# Patient Record
Sex: MALE | Race: WHITE | NOT HISPANIC OR LATINO | Employment: UNEMPLOYED | URBAN - NONMETROPOLITAN AREA
[De-identification: names, ages, dates, MRNs, and addresses within clinical notes are randomized per-mention and may not be internally consistent; named-entity substitution may affect disease eponyms.]

---

## 2017-02-14 NOTE — TELEPHONE ENCOUNTER
Spoke with girlfriend let her know we cannot refill his insulin until he comes in and has labs done

## 2017-07-21 ENCOUNTER — OFFICE VISIT (OUTPATIENT)
Dept: FAMILY MEDICINE CLINIC | Facility: CLINIC | Age: 27
End: 2017-07-21

## 2017-07-21 ENCOUNTER — APPOINTMENT (OUTPATIENT)
Dept: LAB | Facility: HOSPITAL | Age: 27
End: 2017-07-21

## 2017-07-21 VITALS
OXYGEN SATURATION: 98 % | DIASTOLIC BLOOD PRESSURE: 90 MMHG | HEART RATE: 100 BPM | HEIGHT: 72 IN | WEIGHT: 202 LBS | BODY MASS INDEX: 27.36 KG/M2 | SYSTOLIC BLOOD PRESSURE: 128 MMHG

## 2017-07-21 DIAGNOSIS — F41.1 GENERALIZED ANXIETY DISORDER: ICD-10-CM

## 2017-07-21 DIAGNOSIS — N28.9 RENAL INSUFFICIENCY: ICD-10-CM

## 2017-07-21 DIAGNOSIS — J30.9 ALLERGIC RHINITIS, UNSPECIFIED ALLERGIC RHINITIS TRIGGER, UNSPECIFIED RHINITIS SEASONALITY: ICD-10-CM

## 2017-07-21 DIAGNOSIS — E10.69 TYPE 1 DIABETES MELLITUS WITH OTHER SPECIFIED COMPLICATION (HCC): Primary | ICD-10-CM

## 2017-07-21 LAB
ALBUMIN UR-MCNC: 0.8 MG/L
ANION GAP SERPL CALCULATED.3IONS-SCNC: 16 MMOL/L (ref 5–15)
BUN BLD-MCNC: 25 MG/DL (ref 7–21)
BUN/CREAT SERPL: 21.9 (ref 7–25)
CALCIUM SPEC-SCNC: 9.3 MG/DL (ref 8.4–10.2)
CHLORIDE SERPL-SCNC: 96 MMOL/L (ref 95–110)
CK SERPL-CCNC: 286 U/L (ref 55–170)
CO2 SERPL-SCNC: 26 MMOL/L (ref 22–31)
CREAT BLD-MCNC: 1.14 MG/DL (ref 0.7–1.3)
GFR SERPL CREATININE-BSD FRML MDRD: 76 ML/MIN/1.73 (ref 60–179)
GLUCOSE BLD-MCNC: 241 MG/DL (ref 60–100)
POTASSIUM BLD-SCNC: 4.1 MMOL/L (ref 3.5–5.1)
SODIUM BLD-SCNC: 138 MMOL/L (ref 137–145)

## 2017-07-21 PROCEDURE — 36415 COLL VENOUS BLD VENIPUNCTURE: CPT | Performed by: FAMILY MEDICINE

## 2017-07-21 PROCEDURE — 80048 BASIC METABOLIC PNL TOTAL CA: CPT | Performed by: FAMILY MEDICINE

## 2017-07-21 PROCEDURE — 82550 ASSAY OF CK (CPK): CPT | Performed by: FAMILY MEDICINE

## 2017-07-21 PROCEDURE — 82043 UR ALBUMIN QUANTITATIVE: CPT | Performed by: FAMILY MEDICINE

## 2017-07-21 PROCEDURE — 99214 OFFICE O/P EST MOD 30 MIN: CPT | Performed by: FAMILY MEDICINE

## 2017-07-21 RX ORDER — FLUTICASONE PROPIONATE 50 MCG
2 SPRAY, SUSPENSION (ML) NASAL DAILY
Qty: 1 EACH | Refills: 0 | Status: SHIPPED | OUTPATIENT
Start: 2017-07-21 | End: 2017-08-20

## 2017-07-21 RX ORDER — BUSPIRONE HYDROCHLORIDE 10 MG/1
TABLET ORAL
Qty: 60 TABLET | Refills: 5 | Status: SHIPPED | OUTPATIENT
Start: 2017-07-21 | End: 2017-08-23 | Stop reason: SDUPTHER

## 2017-07-21 NOTE — PROGRESS NOTES
Subjective   Chief Complaint   Patient presents with   • Establish Care       Woody Pelletier is a 26 y.o. male who presents for Establish Care     New to [facility] info:  Patient Care Team:  Ciara Brower MD as PCP - General (Internal Medicine)     History of Present Illness  Patient is here to establish care.  He has a history of type 1 diabetes that was diagnosed when he was an adolescent.  He was previously taking Lantus 24 units at bedtime as well as NovoLog 21 units 3 times daily with meals however he recently got some Toujeo from a friend and uses his blood sugar was much better controlled.  His blood sugar ranged from the low 100s to 180s are 190s with this and his mealtime NovoLog.    He recently had some blood work done at work which showed renal insufficiency.  He has been working out a lot lately and doing heavy lifting.    He also reports generalized anxiety, stress, temper issues.  He was previously tried on Celexa and has some difficulties urinating on this was changed to Lexapro which she states did not not help him after taking it for several weeks. Denies SI/HI    The following portions of the patient's history were reviewed and updated as appropriate:    Past Medical History:   Diagnosis Date   • Depression    • Diabetes mellitus        Past Surgical History:   Procedure Laterality Date   • ESOPHAGOSCOPY / EGD  11/09/2011    Daja-De Leon tear in esophagus. Epinephrine injection therapy applied to control bleeding. Area of erosion in body of stomach. Epinephrine injection therapy applied to control bleeding. Argon beam coagulation applied to control bleeding. Norm duod.       Family History   Problem Relation Age of Onset   • Diabetes Other    • Hypertension Other        Social History     Social History   • Marital status: Single     Spouse name: N/A   • Number of children: N/A   • Years of education: N/A     Occupational History   • Not on file.     Social History Main Topics   • Smoking  "status: Never Smoker   • Smokeless tobacco: Never Used   • Alcohol use Yes      Comment: SOCIAL   • Drug use: No   • Sexual activity: Not on file     Other Topics Concern   • Not on file     Social History Narrative       Medications:  Outpatient Medications Prior to Visit   Medication Sig Dispense Refill   • insulin aspart (NOVOLOG) 100 UNIT/ML injection Inject 21 Units under the skin 3 (Three) Times a Day Before Meals. 20 mL 3   • Insulin Syringe 31G X 5/16\" 0.5 ML misc USE AS DIRECTED FOUR TIMES DAILY 20 each 0   • Insulin Syringe 31G X 5/16\" 1 ML misc USE FOR INJECTIONS FOUR TIMES DAILY 30 each 0   • escitalopram (LEXAPRO) 20 MG tablet Take 1 tablet by mouth daily. 30 tablet 0   • insulin glargine (LANTUS) 100 UNIT/ML injection Inject 24 Units under the skin Every Night. 10 mL 3     No facility-administered medications prior to visit.        Allergies   Allergen Reactions   • Penicillins        Review of Systems   Constitutional: Negative for fatigue, fever and unexpected weight change.   HENT: Positive for congestion, postnasal drip and sneezing. Negative for rhinorrhea.    Eyes: Negative for visual disturbance.   Respiratory: Negative for cough and shortness of breath.    Cardiovascular: Negative for chest pain, palpitations and leg swelling.   Gastrointestinal: Negative for abdominal pain, blood in stool, constipation and diarrhea.   Endocrine: Negative for polydipsia, polyphagia and polyuria.        No hypoglycemia   Genitourinary: Negative for difficulty urinating, frequency and hematuria.   Musculoskeletal: Negative for arthralgias and back pain.   Skin: Negative for rash.   Neurological: Negative for headaches.   Psychiatric/Behavioral: Negative for sleep disturbance and suicidal ideas. The patient is nervous/anxious.        Objective   Visit Vitals   • /90   • Pulse 100   • Ht 72\" (182.9 cm)   • Wt 202 lb (91.6 kg)   • SpO2 98%   • BMI 27.4 kg/m2       Physical Exam   Constitutional: He is oriented " to person, place, and time. He appears well-developed and well-nourished. No distress.   HENT:   Head: Normocephalic and atraumatic.   Nose: Nose normal.   Eyes: Conjunctivae and EOM are normal.   Neck: Normal range of motion.   Cardiovascular: Normal rate, regular rhythm and normal heart sounds.  Exam reveals no gallop and no friction rub.    No murmur heard.  Pulmonary/Chest: Effort normal and breath sounds normal. No respiratory distress. He has no wheezes. He has no rales.   Musculoskeletal: Normal range of motion. He exhibits no edema.   Neurological: He is alert and oriented to person, place, and time. No cranial nerve deficit.   Skin: Skin is warm and dry. He is not diaphoretic.   Psychiatric: He has a normal mood and affect. His behavior is normal.   Nursing note and vitals reviewed.      No flowsheet data found.    Assessment/Plan   Woody Pelletier is a 26 y.o. male seen today for the followin. Type 1 diabetes mellitus with other specified complication  Change insulin to Toujeo. He will track blood sugar and bring to follow up visit in 3 months.   Change novolog to pen for easier use    - Insulin Glargine (TOUJEO SOLOSTAR) 300 UNIT/ML solution pen-injector; Inject 24 Units under the skin Daily.  Dispense: 2 pen; Refill: 11  - Insulin Pen Needle (B-D UF III MINI PEN NEEDLES) 31G X 5 MM misc; 1 each 4 (Four) Times a Day. Use with insulin pens  Dispense: 200 each; Refill: 11  - Basic Metabolic Panel  - MicroAlbumin, Urine, Random  - Ambulatory Referral to Ophthalmology  - insulin aspart (NOVOLOG FLEXPEN) 100 UNIT/ML solution pen-injector sc pen; Inject 10 Units under the skin 3 (Three) Times a Day With Meals.  Dispense: 2 pen; Refill: 11    2. Renal insufficiency  Repeat BMP. Will also check ck. Advised hydration    - Basic Metabolic Panel  - CK    3. Generalized anxiety disorder  Trial of buspar. Follow up 3 months, sooner if any problems    - busPIRone (BUSPAR) 10 MG tablet; Take 1/2 tab po bid x 7  days, then 1 tab po bid  Dispense: 60 tablet; Refill: 5    4. Allergic rhinitis, unspecified allergic rhinitis trigger, unspecified rhinitis seasonality  Start flonase    - fluticasone (FLONASE) 50 MCG/ACT nasal spray; 2 sprays into each nostril Daily for 30 days.  Dispense: 1 each; Refill: 0    Follow up: Return in about 3 months (around 10/21/2017) for Follow up Diabetes with labs few days before.            This document has been electronically signed by Annika Truong DO on July 21, 2017 5:03 PM

## 2017-07-25 ENCOUNTER — TELEPHONE (OUTPATIENT)
Dept: FAMILY MEDICINE CLINIC | Facility: CLINIC | Age: 27
End: 2017-07-25

## 2017-07-25 NOTE — TELEPHONE ENCOUNTER
----- Message from Annika Truong DO sent at 7/21/2017  1:36 PM CDT -----  Renal function was a little better than previous labs. His CK level was a little elevated so he is having some muscle breakdown which may be causing some damage to his kidneys. He needs to be sure to stay well hydrated and may want to decrease the intensity of heavy weight lifting that he is doing.      Pr Dr. Truong, Mr. Pelletier has been called with his recent lab results & recommendations.  Continue his current medications and follow-up as planned or sooner if any problems.

## 2017-07-25 NOTE — PROGRESS NOTES
Pr Dr. Truong, Mr. Pelletier has been called with his recent lab results & recommendations.  Continue his current medications and follow-up as planned or sooner if any problems.

## 2017-07-25 NOTE — TELEPHONE ENCOUNTER
----- Message from Krys Davenport sent at 7/25/2017  8:31 AM CDT -----  Regarding: MED CHANGE  Contact: [phone]  PT CALLED AND IS WANTING TO KNOW IF HE CAN GET HIS NOVOLOG WHICH IS GOING TO COST HIM $50 TO NOVOLIN R WHICH WILL COST HIM $12 BECAUSE IT WILL BE CHEAPER AND THE INS WILL PAY FOR IT. PLEASE SEND IT IN TO [facility] [place]. THANK YOU KRYS.

## 2017-08-23 ENCOUNTER — OFFICE VISIT (OUTPATIENT)
Dept: FAMILY MEDICINE CLINIC | Facility: CLINIC | Age: 27
End: 2017-08-23

## 2017-08-23 ENCOUNTER — APPOINTMENT (OUTPATIENT)
Dept: LAB | Facility: HOSPITAL | Age: 27
End: 2017-08-23

## 2017-08-23 VITALS
HEART RATE: 108 BPM | BODY MASS INDEX: 28.79 KG/M2 | DIASTOLIC BLOOD PRESSURE: 86 MMHG | SYSTOLIC BLOOD PRESSURE: 138 MMHG | WEIGHT: 212.6 LBS | HEIGHT: 72 IN | OXYGEN SATURATION: 98 %

## 2017-08-23 DIAGNOSIS — Z79.899 CONTROLLED SUBSTANCE AGREEMENT SIGNED: ICD-10-CM

## 2017-08-23 DIAGNOSIS — E10.69 TYPE 1 DIABETES MELLITUS WITH OTHER SPECIFIED COMPLICATION (HCC): Primary | ICD-10-CM

## 2017-08-23 DIAGNOSIS — F41.1 GENERALIZED ANXIETY DISORDER: ICD-10-CM

## 2017-08-23 DIAGNOSIS — G89.29 CHRONIC MIDLINE LOW BACK PAIN WITH SCIATICA, SCIATICA LATERALITY UNSPECIFIED: ICD-10-CM

## 2017-08-23 DIAGNOSIS — R03.0 ELEVATED BLOOD PRESSURE READING WITHOUT DIAGNOSIS OF HYPERTENSION: ICD-10-CM

## 2017-08-23 DIAGNOSIS — M54.40 CHRONIC MIDLINE LOW BACK PAIN WITH SCIATICA, SCIATICA LATERALITY UNSPECIFIED: ICD-10-CM

## 2017-08-23 LAB
ANION GAP SERPL CALCULATED.3IONS-SCNC: 9 MMOL/L (ref 5–15)
BUN BLD-MCNC: 17 MG/DL (ref 7–21)
BUN/CREAT SERPL: 14.5 (ref 7–25)
CALCIUM SPEC-SCNC: 8.9 MG/DL (ref 8.4–10.2)
CHLORIDE SERPL-SCNC: 103 MMOL/L (ref 95–110)
CO2 SERPL-SCNC: 28 MMOL/L (ref 22–31)
CREAT BLD-MCNC: 1.17 MG/DL (ref 0.7–1.3)
GFR SERPL CREATININE-BSD FRML MDRD: 74 ML/MIN/1.73 (ref 77–179)
GLUCOSE BLD-MCNC: 149 MG/DL (ref 60–100)
HBA1C MFR BLD: 10.7 % (ref 4–5.6)
POTASSIUM BLD-SCNC: 4.1 MMOL/L (ref 3.5–5.1)
SODIUM BLD-SCNC: 140 MMOL/L (ref 137–145)

## 2017-08-23 PROCEDURE — 99214 OFFICE O/P EST MOD 30 MIN: CPT | Performed by: FAMILY MEDICINE

## 2017-08-23 PROCEDURE — 36415 COLL VENOUS BLD VENIPUNCTURE: CPT | Performed by: FAMILY MEDICINE

## 2017-08-23 PROCEDURE — 80307 DRUG TEST PRSMV CHEM ANLYZR: CPT | Performed by: FAMILY MEDICINE

## 2017-08-23 PROCEDURE — 80048 BASIC METABOLIC PNL TOTAL CA: CPT | Performed by: FAMILY MEDICINE

## 2017-08-23 PROCEDURE — G0481 DRUG TEST DEF 8-14 CLASSES: HCPCS | Performed by: FAMILY MEDICINE

## 2017-08-23 PROCEDURE — 83036 HEMOGLOBIN GLYCOSYLATED A1C: CPT | Performed by: FAMILY MEDICINE

## 2017-08-23 RX ORDER — BUSPIRONE HYDROCHLORIDE 10 MG/1
20 TABLET ORAL 2 TIMES DAILY PRN
Qty: 120 TABLET | Refills: 5 | Status: SHIPPED | OUTPATIENT
Start: 2017-08-23 | End: 2018-07-25

## 2017-08-23 RX ORDER — GABAPENTIN 300 MG/1
300 CAPSULE ORAL
Qty: 30 CAPSULE | Refills: 5 | Status: SHIPPED | OUTPATIENT
Start: 2017-08-23 | End: 2017-12-19

## 2017-08-23 RX ORDER — LISINOPRIL 5 MG/1
5 TABLET ORAL DAILY
Qty: 30 TABLET | Refills: 11 | Status: SHIPPED | OUTPATIENT
Start: 2017-08-23 | End: 2020-03-30

## 2017-08-23 NOTE — PROGRESS NOTES
Subjective   Chief Complaint   Patient presents with   • Follow-up     blood pressure check       Woody Pelletier is a 26 y.o. male who presents for Follow-up (blood pressure check)   Diabetes   He presents for his follow-up diabetic visit. He has type 2 diabetes mellitus. Pertinent negatives for hypoglycemia include no headaches or nervousness/anxiousness. (Had hypoglycemia x 1 (blood sugar went down to 60) after he skipped a meal and did some yard work) Pertinent negatives for diabetes include no chest pain, no fatigue, no foot paresthesias, no polydipsia, no polyphagia and no polyuria. Current diabetic treatment includes insulin injections. He is compliant with treatment all of the time. His weight is stable. He is following a diabetic diet. He participates in exercise three times a week. Home blood sugar record trend: improving. His breakfast blood glucose range is generally 130-140 mg/dl. His lunch blood glucose range is generally 140-180 mg/dl. His dinner blood glucose range is generally 140-180 mg/dl. An ACE inhibitor/angiotensin II receptor blocker is not being taken.   Back Pain   This is a chronic problem. The current episode started more than 1 month ago. The problem occurs daily. The problem has been waxing and waning since onset. The pain is present in the lumbar spine. The quality of the pain is described as aching. The pain radiates to the left thigh (buttock). Pertinent negatives include no abdominal pain, chest pain, fever or headaches. He has tried analgesics for the symptoms. The treatment provided mild relief.      Elevated BP:  BP has been elevated at work. Was 180-190 systolic at work yesterday. Came down after he relaxed for a bit. Has not been on bp med in the past. Denies headaches, chest pain, edema.   Has been working on hydrating well and has stopped creatine powder that he was taking    Anxiety:  Has been taking buspar 10 4-5 times daily. Hasn't noticed much improvement.     The  following portions of the patient's history were reviewed and updated as appropriate:problem list, current medications, allergies, past family history, past medical history, past social history and past surgical history    Past Medical History:   Diagnosis Date   • Depression    • Diabetes mellitus        Social History   Substance Use Topics   • Smoking status: Never Smoker   • Smokeless tobacco: Never Used   • Alcohol use Yes      Comment: SOCIAL       Medications:  Outpatient Medications Prior to Visit   Medication Sig Dispense Refill   • Insulin Glargine (TOUJEO SOLOSTAR) 300 UNIT/ML solution pen-injector Inject 24 Units under the skin Daily. 2 pen 11   • Insulin Pen Needle (B-D UF III MINI PEN NEEDLES) 31G X 5 MM misc 1 each 4 (Four) Times a Day. Use with insulin pens 200 each 11   • insulin regular (NOVOLIN R) 100 UNIT/ML injection Inject 10 Units under the skin 3 (Three) Times a Day Before Meals. 6 mL 11   • busPIRone (BUSPAR) 10 MG tablet Take 1/2 tab po bid x 7 days, then 1 tab po bid 60 tablet 5     No facility-administered medications prior to visit.        Allergies   Allergen Reactions   • Penicillins        Review of Systems   Constitutional: Negative for fatigue, fever and unexpected weight change.   HENT: Negative for rhinorrhea.    Eyes: Negative for visual disturbance.   Respiratory: Negative for cough and shortness of breath.    Cardiovascular: Negative for chest pain, palpitations and leg swelling.   Gastrointestinal: Negative for abdominal pain, blood in stool, constipation and diarrhea.   Endocrine: Negative for polydipsia, polyphagia and polyuria.   Genitourinary: Negative for difficulty urinating.   Musculoskeletal: Positive for back pain. Negative for arthralgias.   Skin: Negative for rash.   Neurological: Negative for headaches.   Psychiatric/Behavioral: Negative for sleep disturbance. The patient is not nervous/anxious.        Objective   Visit Vitals   • /86 (BP Location: Left arm,  "Patient Position: Sitting, Cuff Size: Large Adult)   • Pulse 108   • Ht 72\" (182.9 cm)   • Wt 212 lb 9.6 oz (96.4 kg)   • SpO2 98%   • BMI 28.83 kg/m2       Physical Exam   Constitutional: He is oriented to person, place, and time. He appears well-developed and well-nourished. No distress.   HENT:   Head: Normocephalic.   Nose: Nose normal.   Eyes: Conjunctivae are normal.   Neck: Normal range of motion.   Pulmonary/Chest: Effort normal. No respiratory distress.   Abdominal: He exhibits no distension.   Musculoskeletal: Normal range of motion. He exhibits no edema.        Lumbar back: He exhibits normal range of motion.   Neurological: He is alert and oriented to person, place, and time. He has normal strength. No cranial nerve deficit. Coordination and gait normal.   Skin: He is not diaphoretic.   Psychiatric: He has a normal mood and affect. His behavior is normal.   Nursing note and vitals reviewed.      Assessment/Plan   Woody Pelletier is a 26 y.o. male seen today for the followin. Type 1 diabetes mellitus with other specified complication  Start lisinopril for renal protection.  Check labs today  Continue current insulin regimen of Toujeo 24 units daily and novolin R 10 units tid with meals.   Discussed importance of not skipping meals.  Follow up 3 months    - lisinopril (PRINIVIL,ZESTRIL) 5 MG tablet; Take 1 tablet by mouth Daily.  Dispense: 30 tablet; Refill: 11  - Hemoglobin A1c  - Basic Metabolic Panel    2. Elevated blood pressure reading without diagnosis of hypertension  Start low dose lisinopril. Repeat bmp in 3 months    - lisinopril (PRINIVIL,ZESTRIL) 5 MG tablet; Take 1 tablet by mouth Daily.  Dispense: 30 tablet; Refill: 11  - Basic Metabolic Panel    3. Generalized anxiety disorder  Increase to 20 mg tid prn    - busPIRone (BUSPAR) 10 MG tablet; Take 2 tablets by mouth 2 (Two) Times a Day As Needed (anxiety).  Dispense: 120 tablet; Refill: 5    4. Controlled substance agreement " signed  Start gabapentin 300 qhs.   [facility] Controlled Substance Policy discussed with patient. Controlled substance contract signed by myself and patient. Patient is in agreement with treatment plan and is aware that refills will not be done outside of appointments. Patient is aware of potential for addiction and dependence. Contract scanned into chart.  UDS today  SANCHEZ reviewed and was appropriate. No evidence of misuse or diversion. Report scanned into chart.     - ToxASSURE Select 13 (MW)    5. Chronic midline low back pain with sciatica, sciatica laterality unspecified  Start gabapentin. Will titrate prn. Risks, benefits and possible side effects of medication discussed. Patient voiced understanding.    - gabapentin (NEURONTIN) 300 MG capsule; Take 1 capsule by mouth every night at bedtime.  Dispense: 30 capsule; Refill: 5    Follow up: Return in about 3 months (around 11/23/2017) for Follow up Diabetes.          This document has been electronically signed by Annika Truong DO on August 23, 2017 11:08 AM

## 2017-08-31 ENCOUNTER — TELEPHONE (OUTPATIENT)
Dept: FAMILY MEDICINE CLINIC | Facility: CLINIC | Age: 27
End: 2017-08-31

## 2017-08-31 DIAGNOSIS — E10.69 TYPE 1 DIABETES MELLITUS WITH OTHER SPECIFIED COMPLICATION (HCC): ICD-10-CM

## 2017-08-31 LAB — CONV REPORT SUMMARY: NORMAL

## 2017-09-06 ENCOUNTER — TELEPHONE (OUTPATIENT)
Dept: FAMILY MEDICINE CLINIC | Facility: CLINIC | Age: 27
End: 2017-09-06

## 2017-09-06 DIAGNOSIS — E10.69 TYPE 1 DIABETES MELLITUS WITH OTHER SPECIFIED COMPLICATION (HCC): ICD-10-CM

## 2018-06-07 RX ORDER — HUMAN INSULIN 100 [IU]/ML
INJECTION, SOLUTION SUBCUTANEOUS
Qty: 10 ML | Refills: 0 | Status: SHIPPED | OUTPATIENT
Start: 2018-06-07 | End: 2022-10-13 | Stop reason: SDUPTHER

## 2019-06-26 ENCOUNTER — TRANSCRIBE ORDERS (OUTPATIENT)
Dept: PODIATRY | Facility: CLINIC | Age: 29
End: 2019-06-26

## 2019-06-26 DIAGNOSIS — R20.2 NUMBNESS AND TINGLING OF BOTH FEET: Primary | ICD-10-CM

## 2019-06-26 DIAGNOSIS — R20.0 NUMBNESS AND TINGLING OF BOTH FEET: Primary | ICD-10-CM

## 2019-08-13 ENCOUNTER — APPOINTMENT (OUTPATIENT)
Dept: GENERAL RADIOLOGY | Facility: HOSPITAL | Age: 29
End: 2019-08-13

## 2019-08-13 ENCOUNTER — APPOINTMENT (OUTPATIENT)
Dept: CT IMAGING | Facility: HOSPITAL | Age: 29
End: 2019-08-13

## 2019-08-13 ENCOUNTER — HOSPITAL ENCOUNTER (EMERGENCY)
Facility: HOSPITAL | Age: 29
Discharge: HOME | End: 2019-08-13
Attending: EMERGENCY MEDICINE | Admitting: EMERGENCY MEDICINE

## 2019-08-13 VITALS
SYSTOLIC BLOOD PRESSURE: 170 MMHG | TEMPERATURE: 98.6 F | DIASTOLIC BLOOD PRESSURE: 76 MMHG | HEIGHT: 72 IN | OXYGEN SATURATION: 98 % | WEIGHT: 214 LBS | HEART RATE: 106 BPM | RESPIRATION RATE: 20 BRPM | BODY MASS INDEX: 28.99 KG/M2

## 2019-08-13 DIAGNOSIS — S39.012A STRAIN OF LUMBAR REGION, INITIAL ENCOUNTER: Primary | ICD-10-CM

## 2019-08-13 PROCEDURE — 73502 X-RAY EXAM HIP UNI 2-3 VIEWS: CPT

## 2019-08-13 PROCEDURE — 72131 CT LUMBAR SPINE W/O DYE: CPT

## 2019-08-13 PROCEDURE — 96372 THER/PROPH/DIAG INJ SC/IM: CPT

## 2019-08-13 PROCEDURE — 25010000002 KETOROLAC TROMETHAMINE PER 15 MG: Performed by: PHYSICIAN ASSISTANT

## 2019-08-13 PROCEDURE — 25010000002 ORPHENADRINE CITRATE PER 60 MG: Performed by: PHYSICIAN ASSISTANT

## 2019-08-13 PROCEDURE — 99282 EMERGENCY DEPT VISIT SF MDM: CPT

## 2019-08-13 RX ORDER — KETOROLAC TROMETHAMINE 30 MG/ML
30 INJECTION, SOLUTION INTRAMUSCULAR; INTRAVENOUS EVERY 6 HOURS PRN
Status: DISCONTINUED | OUTPATIENT
Start: 2019-08-13 | End: 2019-08-13 | Stop reason: HOSPADM

## 2019-08-13 RX ORDER — DICLOFENAC SODIUM 75 MG/1
75 TABLET, DELAYED RELEASE ORAL 2 TIMES DAILY
Qty: 14 TABLET | Refills: 0 | Status: SHIPPED | OUTPATIENT
Start: 2019-08-13 | End: 2019-08-20

## 2019-08-13 RX ORDER — CYCLOBENZAPRINE HCL 10 MG
10 TABLET ORAL 2 TIMES DAILY PRN
Qty: 14 TABLET | Refills: 0 | Status: SHIPPED | OUTPATIENT
Start: 2019-08-13 | End: 2019-08-20

## 2019-08-13 RX ORDER — ORPHENADRINE CITRATE 30 MG/ML
60 INJECTION INTRAMUSCULAR; INTRAVENOUS ONCE
Status: COMPLETED | OUTPATIENT
Start: 2019-08-13 | End: 2019-08-13

## 2019-08-13 RX ADMIN — ORPHENADRINE CITRATE 60 MG: 30 INJECTION INTRAMUSCULAR; INTRAVENOUS at 16:10

## 2019-08-13 RX ADMIN — KETOROLAC TROMETHAMINE 30 MG: 30 INJECTION, SOLUTION INTRAMUSCULAR; INTRAVENOUS at 16:10

## 2019-08-13 NOTE — DISCHARGE INSTRUCTIONS
Stop taking testosterone injections. This will worsen your back and leg pain. Return to the ER with any concerning or worsening symptoms.

## 2019-08-13 NOTE — ED PROVIDER NOTES
Subjective   Pt reports low back pain with RLE radiculopathy started 4 days ago and now in the ED due to worsening symptoms. Denies any urinary/bowel incontinence or saddle anesthesia.         History provided by:  Patient   used: No    Hip Pain   Associated symptoms: myalgias    Associated symptoms: no congestion, no cough, no fatigue, no shortness of breath and no vomiting        Review of Systems   Constitutional: Negative for fatigue.   HENT: Negative for congestion.    Respiratory: Negative for cough and shortness of breath.    Gastrointestinal: Negative for vomiting.   Endocrine: Negative for polyuria.   Musculoskeletal: Positive for arthralgias, back pain and myalgias.   Skin: Negative for color change.   Neurological: Negative for syncope.   Psychiatric/Behavioral: Negative for agitation.       Past Medical History:   Diagnosis Date   • Depression    • Diabetes mellitus ([facility])    • Hypertension        Allergies   Allergen Reactions   • Penicillins        Past Surgical History:   Procedure Laterality Date   • ESOPHAGOSCOPY / EGD  11/09/2011    Daja-De Leon tear in esophagus. Epinephrine injection therapy applied to control bleeding. Area of erosion in body of stomach. Epinephrine injection therapy applied to control bleeding. Argon beam coagulation applied to control bleeding. Norm duod.       Family History   Problem Relation Age of Onset   • Diabetes Other    • Hypertension Other        Social History     Socioeconomic History   • Marital status:      Spouse name: Not on file   • Number of children: Not on file   • Years of education: Not on file   • Highest education level: Not on file   Tobacco Use   • Smoking status: Never Smoker   • Smokeless tobacco: Never Used   Substance and Sexual Activity   • Alcohol use: Yes     Comment: SOCIAL   • Drug use: No           Objective   Physical Exam   Constitutional: He is oriented to person, place, and time. He appears well-developed  and well-nourished.   HENT:   Head: Normocephalic.   Right Ear: Hearing normal.   Left Ear: Hearing normal.   Nose: Nose normal.   Eyes: Conjunctivae, EOM and lids are normal.   Neck: Trachea normal and full passive range of motion without pain.   Cardiovascular: Regular rhythm, S1 normal, S2 normal, normal heart sounds and normal pulses.   Pulmonary/Chest: Effort normal and breath sounds normal.   Abdominal: Normal appearance and bowel sounds are normal.   Musculoskeletal:        Lumbar back: He exhibits tenderness. He exhibits no swelling.   Neurological: He is alert and oriented to person, place, and time. He is not disoriented.   Skin: Skin is warm and dry. He is not diaphoretic.   Psychiatric: He has a normal mood and affect. His speech is normal and behavior is normal. Thought content normal.   Nursing note and vitals reviewed.      Procedures           Labs Reviewed - No data to display    CT Lumbar Spine Without Contrast   Final Result   No evidence of acute traumatic osseous injury. No   age incongruent degenerative changes are present.         If pain or symptoms persist beyond reasonable expectations, a   follow up radiographic and/or MRI examination is suggested, as is   deemed clinically indicated.      Electronically signed by:  Mónica Liang MD  8/13/2019 4:38 PM CDT   Workstation: [id number]      XR Hip With or Without Pelvis 2 - 3 View Right   Final Result   Unremarkable right hip and AP pelvis. No acute   abnormalities are seen.         Electronically signed by:  Jan Bee MD  8/13/2019 4:26 PM   CDT Workstation: [id number]            ED Course      5:30P  Informed pt imaging results. Pt reports feeling better after medications. Provided on the d/c instructions that testosterone injections on his hip exacerbates his pain and advised to stop injections. Will d/c pt and all questions addressed at this time.             MDM      Final diagnoses:   Strain of lumbar region, initial encounter             Fidelia Garcia PA-C  08/13/19 2011

## 2019-08-13 NOTE — ED TRIAGE NOTES
"Pt describes pain as \"someone digging a knife right here,\" referring to area around lateral right buttock that radiates to anterior right lower leg and dorsal foot.  "

## 2019-08-21 ENCOUNTER — TRANSCRIBE ORDERS (OUTPATIENT)
Dept: ULTRASOUND IMAGING | Facility: HOSPITAL | Age: 29
End: 2019-08-21

## 2019-08-21 DIAGNOSIS — M79.661 PAIN IN RIGHT LOWER LEG: Primary | ICD-10-CM

## 2019-08-23 ENCOUNTER — APPOINTMENT (OUTPATIENT)
Dept: ULTRASOUND IMAGING | Facility: HOSPITAL | Age: 29
End: 2019-08-23

## 2019-08-23 ENCOUNTER — TELEPHONE (OUTPATIENT)
Dept: GENERAL RADIOLOGY | Facility: HOSPITAL | Age: 29
End: 2019-08-23

## 2019-08-23 NOTE — TELEPHONE ENCOUNTER
Called spoke to david informed her that the patient was a no show for appointment on 08/23/19 at 10:15am for a US venous doppler lower extremity right

## 2019-08-28 ENCOUNTER — HOSPITAL ENCOUNTER (OUTPATIENT)
Dept: ULTRASOUND IMAGING | Facility: HOSPITAL | Age: 29
Discharge: HOME | End: 2019-08-28
Admitting: NURSE PRACTITIONER

## 2019-08-28 DIAGNOSIS — M79.661 PAIN IN RIGHT LOWER LEG: ICD-10-CM

## 2019-08-28 PROCEDURE — 93971 EXTREMITY STUDY: CPT

## 2020-03-30 PROCEDURE — 87635 SARS-COV-2 COVID-19 AMP PRB: CPT | Performed by: NURSE PRACTITIONER

## 2020-03-30 PROCEDURE — U0003 INFECTIOUS AGENT DETECTION BY NUCLEIC ACID (DNA OR RNA); SEVERE ACUTE RESPIRATORY SYNDROME CORONAVIRUS 2 (SARS-COV-2) (CORONAVIRUS DISEASE [COVID-19]), AMPLIFIED PROBE TECHNIQUE, MAKING USE OF HIGH THROUGHPUT TECHNOLOGIES AS DESCRIBED BY CMS-2020-01-R: HCPCS | Performed by: NURSE PRACTITIONER

## 2020-11-02 ENCOUNTER — TRANSCRIBE ORDERS (OUTPATIENT)
Dept: PODIATRY | Facility: CLINIC | Age: 30
End: 2020-11-02

## 2020-11-02 ENCOUNTER — OFFICE VISIT (OUTPATIENT)
Dept: PODIATRY | Facility: CLINIC | Age: 30
End: 2020-11-02

## 2020-11-02 VITALS — HEIGHT: 72 IN | WEIGHT: 215.6 LBS | HEART RATE: 118 BPM | OXYGEN SATURATION: 99 % | BODY MASS INDEX: 29.2 KG/M2

## 2020-11-02 DIAGNOSIS — M79.675 PAIN OF TOE OF LEFT FOOT: ICD-10-CM

## 2020-11-02 DIAGNOSIS — E11.42 DIABETIC POLYNEUROPATHY ASSOCIATED WITH TYPE 2 DIABETES MELLITUS (HCC): ICD-10-CM

## 2020-11-02 DIAGNOSIS — L60.0 INGROWN LEFT GREATER TOENAIL: ICD-10-CM

## 2020-11-02 DIAGNOSIS — L03.032 PARONYCHIA OF GREAT TOE OF LEFT FOOT: Primary | ICD-10-CM

## 2020-11-02 DIAGNOSIS — L60.0 INGROWN TOENAIL: Primary | ICD-10-CM

## 2020-11-02 PROCEDURE — 99203 OFFICE O/P NEW LOW 30 MIN: CPT | Performed by: PODIATRIST

## 2020-11-02 NOTE — PROGRESS NOTES
Woody Pelletier  1990  30 y.o. male   PCP: MILA Miller APRN: 10/30/2020  BS: 163 per patient     Patient presents to clinic today with the complaint of left hallux ingrown nail.     11/02/2020  Chief Complaint   Patient presents with   • Left Foot - Nail Problem           History of Present Illness    Woody Pelletier is a 30 y.o. male presents for evaluation of ingrowing left great toenail.  States is been ongoing for a few weeks.  There is some sharp pain with direct pressure.  He states there has been some redness and drainage.  He is currently taking antibiotics for this issue.  He also states he has had to drain this area on his own on a couple of occasions.  He denies any other prior formal treatments.      Past Medical History:   Diagnosis Date   • Depression    • Diabetes mellitus (CMS/[facility])    • Hypertension          Past Surgical History:   Procedure Laterality Date   • ESOPHAGOSCOPY / EGD  11/09/2011    Daja-De Leon tear in esophagus. Epinephrine injection therapy applied to control bleeding. Area of erosion in body of stomach. Epinephrine injection therapy applied to control bleeding. Argon beam coagulation applied to control bleeding. Norm duod.         Family History   Problem Relation Age of Onset   • Diabetes Other    • Hypertension Other    • Diabetes Mother          Social History     Socioeconomic History   • Marital status:      Spouse name: Not on file   • Number of children: Not on file   • Years of education: Not on file   • Highest education level: Not on file   Tobacco Use   • Smoking status: Never Smoker   • Smokeless tobacco: Never Used   Substance and Sexual Activity   • Alcohol use: Yes     Comment: SOCIAL   • Drug use: No         Current Outpatient Medications   Medication Sig Dispense Refill   • Insulin Glargine (TOUJEO SOLOSTAR) 300 UNIT/ML solution pen-injector Inject 30 Units under the skin Daily. 2 pen 11   • insulin lispro (humaLOG) 100 UNIT/ML injection  "Inject  under the skin into the appropriate area as directed 3 (Three) Times a Day.     • Insulin Pen Needle (B-D UF III MINI PEN NEEDLES) 31G X 5 MM misc 1 each 4 (Four) Times a Day. Use with insulin pens 200 each 11   • mupirocin (BACTROBAN) 2 % ointment      • NOVOLIN R 100 UNIT/ML injection ADMINISTER 10 UNITS UNDER THE SKIN THREE TIMES DAILY BEFORE MEALS 10 mL 0   • brompheniramine-pseudoephedrine-DM (Bromfed DM) 30-2-10 MG/5ML syrup Take one tsp BID prn cough and congestion 120 mL 0   • omeprazole (priLOSEC) 20 MG capsule TK 1 C PO BID  5     No current facility-administered medications for this visit.          OBJECTIVE    Pulse 118   Ht 182.9 cm (72\")   Wt 97.8 kg (215 lb 9.6 oz)   SpO2 99%   BMI 29.24 kg/m²       Review of Systems   Constitutional: Positive for activity change.   HENT: Negative.    Eyes: Negative.    Respiratory: Negative.    Cardiovascular: Negative.    Gastrointestinal: Negative.    Endocrine: Negative.    Genitourinary: Negative.    Musculoskeletal:        Foot pain   Skin: Negative.    Allergic/Immunologic: Negative.    Neurological: Negative.    Hematological: Negative.    Psychiatric/Behavioral: Negative.          Physical Exam   Constitutional: he appears well-developed and well-nourished.   CV: No chest pain. Normal RR  Resp: Non labored respirations  Psychiatric: he has a normal mood and affect. her behavior is normal.      Lower Extremity Exam:  Vascular: DP/PT pulses palpable 2+.   Mild left hallux edema  Toes warm  Neuro: Protective sensation intact, b/l.  DTRs intact  Integument: No open wounds.  Ingrown medial nail border, left hallux. Mild erythema, edema. +tenderness to palpation.  Web spaces c/d/i  No skin lesions  Musculoskeletal: LE muscle strength 5/5.   Gait normal  Ankle ROM full without pain or crepitus  STJ ROM full without pain or crepitus  No digital deformities      Nail Removal    Date/Time: 11/3/2020 7:45 AM  Performed by: Gregorio Gatica DPM  Authorized " by: Gregorio Gatica DPM   Consent: Written consent obtained.  Risks and benefits: risks, benefits and alternatives were discussed  Consent given by: patient  Location: left foot  Location details: left big toe  Anesthesia: digital block    Anesthesia:  Local Anesthetic: lidocaine 2% without epinephrine  Anesthetic total: 3 mL  Preparation: skin prepped with Betadine  Amount removed: partial  Side: medial  Nail matrix removed: partial  Dressing: 4x4 and antibiotic ointment  Patient tolerance: patient tolerated the procedure well with no immediate complications  Comments: Matrixectomy with 10% NaOH. Neutralized with acetic acid.                  ASSESSMENT AND PLAN    Diagnoses and all orders for this visit:    1. Ingrown toenail (Primary)    2. Pain of toe of left foot    3. Diabetic polyneuropathy associated with type 2 diabetes mellitus (CMS/HCC)      -Comprehensive foot and ankle exam performed  -Diagnosis, prevention, and treatment of ingrown toe nails discussed with patient, including risks and potential benefits of nail avulsion both temporary and permanent versus simple debridement.  -Pt elected for partial permanent avulsion of left hallux  -Aftercare instructions for soapy velásquez soaks BID  -Recheck 2 weeks          This document has been electronically signed by Gregorio Gatica DPM on November 3, 2020 07:45 CST     EMR Dragon/Transcription disclaimer:   Much of this encounter note is an electronic transcription/translation of spoken language to printed text. The electronic translation of spoken language may permit erroneous, or at times, nonsensical words or phrases to be inadvertently transcribed; Although I have reviewed the note for such errors, some may still exist.    Gregorio Gatica DPM  11/3/2020  07:45 CST

## 2020-11-03 PROCEDURE — 11750 EXCISION NAIL&NAIL MATRIX: CPT | Performed by: PODIATRIST

## 2020-11-16 ENCOUNTER — OFFICE VISIT (OUTPATIENT)
Dept: PODIATRY | Facility: CLINIC | Age: 30
End: 2020-11-16

## 2020-11-16 VITALS — HEIGHT: 72 IN | BODY MASS INDEX: 29.2 KG/M2 | WEIGHT: 215.6 LBS | HEART RATE: 115 BPM | OXYGEN SATURATION: 99 %

## 2020-11-16 DIAGNOSIS — S91.209D NAIL AVULSION, TOE, SUBSEQUENT ENCOUNTER: Primary | ICD-10-CM

## 2020-11-16 DIAGNOSIS — E11.42 DIABETIC POLYNEUROPATHY ASSOCIATED WITH TYPE 2 DIABETES MELLITUS (HCC): ICD-10-CM

## 2020-11-16 PROCEDURE — 99212 OFFICE O/P EST SF 10 MIN: CPT | Performed by: PODIATRIST

## 2020-11-16 NOTE — PROGRESS NOTES
Woody Pelletier  1990  30 y.o. male   PCP: MILA Miller APRN: 10/30/2020  BS: 148 per patient     Patient presents to clinic today for a follow up on a nail avulsion.    11/16/2020    Chief Complaint   Patient presents with   • Left Foot - Follow-up           History of Present Illness    Woody Pelletier is a 30 y.o. male presents for follow-up of partial permanent nail avulsion left hallux.  He is doing well with no pain today.    Past Medical History:   Diagnosis Date   • Depression    • Diabetes mellitus (CMS/HCC)    • Hypertension          Past Surgical History:   Procedure Laterality Date   • ESOPHAGOSCOPY / EGD  11/09/2011    Daja-De Leon tear in esophagus. Epinephrine injection therapy applied to control bleeding. Area of erosion in body of stomach. Epinephrine injection therapy applied to control bleeding. Argon beam coagulation applied to control bleeding. Norm duod.         Family History   Problem Relation Age of Onset   • Diabetes Other    • Hypertension Other    • Diabetes Mother          Social History     Socioeconomic History   • Marital status:      Spouse name: Not on file   • Number of children: Not on file   • Years of education: Not on file   • Highest education level: Not on file   Tobacco Use   • Smoking status: Never Smoker   • Smokeless tobacco: Never Used   Substance and Sexual Activity   • Alcohol use: Yes     Comment: SOCIAL   • Drug use: No         Current Outpatient Medications   Medication Sig Dispense Refill   • brompheniramine-pseudoephedrine-DM (Bromfed DM) 30-2-10 MG/5ML syrup Take one tsp BID prn cough and congestion 120 mL 0   • Insulin Glargine (TOUJEO SOLOSTAR) 300 UNIT/ML solution pen-injector Inject 30 Units under the skin Daily. 2 pen 11   • insulin lispro (humaLOG) 100 UNIT/ML injection Inject  under the skin into the appropriate area as directed 3 (Three) Times a Day.     • Insulin Pen Needle (B-D UF III MINI PEN NEEDLES) 31G X 5 MM misc 1 each 4  "(Four) Times a Day. Use with insulin pens 200 each 11   • mupirocin (BACTROBAN) 2 % ointment      • NOVOLIN R 100 UNIT/ML injection ADMINISTER 10 UNITS UNDER THE SKIN THREE TIMES DAILY BEFORE MEALS 10 mL 0   • omeprazole (priLOSEC) 20 MG capsule TK 1 C PO BID  5     No current facility-administered medications for this visit.          OBJECTIVE    Pulse 115   Ht 182.9 cm (72\")   Wt 97.8 kg (215 lb 9.6 oz)   SpO2 99%   BMI 29.24 kg/m²       Review of Systems   Constitutional: Positive for activity change.   HENT: Negative.    Eyes: Negative.    Respiratory: Negative.    Cardiovascular: Negative.    Gastrointestinal: Negative.    Endocrine: Negative.    Genitourinary: Negative.    Musculoskeletal:        Foot pain   Skin: Negative.    Allergic/Immunologic: Negative.    Neurological: Negative.    Hematological: Negative.    Psychiatric/Behavioral: Negative.          Physical Exam   Constitutional: he appears well-developed and well-nourished.   CV: No chest pain. Normal RR  Resp: Non labored respirations  Psychiatric: he has a normal mood and affect. her behavior is normal.      Lower Extremity Exam:  Vascular: DP/PT pulses palpable 2+.   Mild left hallux edema  Toes warm  Neuro: Protective sensation intact, b/l.  DTRs intact  Integument: No open wounds.  Medial nail border of left hallux desiccated.  No drainage.  No sign of infection.  Web spaces c/d/i  No skin lesions  Musculoskeletal: LE muscle strength 5/5.   Gait normal  Ankle ROM full without pain or crepitus  STJ ROM full without pain or crepitus  No digital deformities      Procedures          ASSESSMENT AND PLAN    Diagnoses and all orders for this visit:    1. Nail avulsion, toe, subsequent encounter (Primary)    2. Diabetic polyneuropathy associated with type 2 diabetes mellitus (CMS/HCC)      -Doing well following nail procedure  -May wean soaks and bandaging this week  -Recheck as needed          This document has been electronically signed by Gregorio" MEDHAT Gatica DPM on November 16, 2020 09:26 CST     EMR Dragon/Transcription disclaimer:   Much of this encounter note is an electronic transcription/translation of spoken language to printed text. The electronic translation of spoken language may permit erroneous, or at times, nonsensical words or phrases to be inadvertently transcribed; Although I have reviewed the note for such errors, some may still exist.    Gregorio Gatica DPM  11/16/2020  09:26 CST             English

## 2022-09-23 ENCOUNTER — APPOINTMENT (OUTPATIENT)
Dept: GENERAL RADIOLOGY | Facility: HOSPITAL | Age: 32
End: 2022-09-23

## 2022-09-23 ENCOUNTER — HOSPITAL ENCOUNTER (EMERGENCY)
Facility: HOSPITAL | Age: 32
Discharge: HOME | End: 2022-09-23
Attending: STUDENT IN AN ORGANIZED HEALTH CARE EDUCATION/TRAINING PROGRAM | Admitting: STUDENT IN AN ORGANIZED HEALTH CARE EDUCATION/TRAINING PROGRAM

## 2022-09-23 ENCOUNTER — APPOINTMENT (OUTPATIENT)
Dept: CT IMAGING | Facility: HOSPITAL | Age: 32
End: 2022-09-23

## 2022-09-23 VITALS
TEMPERATURE: 97.9 F | SYSTOLIC BLOOD PRESSURE: 169 MMHG | OXYGEN SATURATION: 99 % | BODY MASS INDEX: 29.07 KG/M2 | HEART RATE: 78 BPM | DIASTOLIC BLOOD PRESSURE: 77 MMHG | HEIGHT: 72 IN | RESPIRATION RATE: 18 BRPM | WEIGHT: 214.6 LBS

## 2022-09-23 DIAGNOSIS — R41.82 ALTERED MENTAL STATUS, UNSPECIFIED ALTERED MENTAL STATUS TYPE: Primary | ICD-10-CM

## 2022-09-23 LAB
ALBUMIN SERPL-MCNC: 2.9 G/DL (ref 3.5–5.2)
ALBUMIN/GLOB SERPL: 1.3 G/DL
ALP SERPL-CCNC: 45 U/L (ref 39–117)
ALT SERPL W P-5'-P-CCNC: 38 U/L (ref 1–41)
ANION GAP SERPL CALCULATED.3IONS-SCNC: 7 MMOL/L (ref 5–15)
APTT PPP: 26.6 SECONDS (ref 20–40.3)
AST SERPL-CCNC: 52 U/L (ref 1–40)
BASOPHILS # BLD AUTO: 0.02 10*3/MM3 (ref 0–0.2)
BASOPHILS NFR BLD AUTO: 0.3 % (ref 0–1.5)
BILIRUB SERPL-MCNC: 0.3 MG/DL (ref 0–1.2)
BUN SERPL-MCNC: 19 MG/DL (ref 6–20)
BUN/CREAT SERPL: 15.2 (ref 7–25)
CALCIUM SPEC-SCNC: 8.2 MG/DL (ref 8.6–10.5)
CHLORIDE SERPL-SCNC: 106 MMOL/L (ref 98–107)
CO2 SERPL-SCNC: 28 MMOL/L (ref 22–29)
CREAT SERPL-MCNC: 1.25 MG/DL (ref 0.76–1.27)
DEPRECATED RDW RBC AUTO: 41.4 FL (ref 37–54)
EGFRCR SERPLBLD CKD-EPI 2021: 78 ML/MIN/1.73
EOSINOPHIL # BLD AUTO: 0 10*3/MM3 (ref 0–0.4)
EOSINOPHIL NFR BLD AUTO: 0 % (ref 0.3–6.2)
ERYTHROCYTE [DISTWIDTH] IN BLOOD BY AUTOMATED COUNT: 12.7 % (ref 12.3–15.4)
GLOBULIN UR ELPH-MCNC: 2.3 GM/DL
GLUCOSE SERPL-MCNC: 179 MG/DL (ref 65–99)
HCT VFR BLD AUTO: 41.4 % (ref 37.5–51)
HGB BLD-MCNC: 14.7 G/DL (ref 13–17.7)
HOLD SPECIMEN: NORMAL
HOLD SPECIMEN: NORMAL
IMM GRANULOCYTES # BLD AUTO: 0.03 10*3/MM3 (ref 0–0.05)
IMM GRANULOCYTES NFR BLD AUTO: 0.5 % (ref 0–0.5)
INR PPP: 0.88 (ref 0.8–1.2)
LYMPHOCYTES # BLD AUTO: 0.86 10*3/MM3 (ref 0.7–3.1)
LYMPHOCYTES NFR BLD AUTO: 13.4 % (ref 19.6–45.3)
MCH RBC QN AUTO: 32 PG (ref 26.6–33)
MCHC RBC AUTO-ENTMCNC: 35.5 G/DL (ref 31.5–35.7)
MCV RBC AUTO: 90.2 FL (ref 79–97)
MONOCYTES # BLD AUTO: 0.39 10*3/MM3 (ref 0.1–0.9)
MONOCYTES NFR BLD AUTO: 6.1 % (ref 5–12)
NEUTROPHILS NFR BLD AUTO: 5.14 10*3/MM3 (ref 1.7–7)
NEUTROPHILS NFR BLD AUTO: 79.7 % (ref 42.7–76)
NRBC BLD AUTO-RTO: 0 /100 WBC (ref 0–0.2)
PLATELET # BLD AUTO: 248 10*3/MM3 (ref 140–450)
PMV BLD AUTO: 9.4 FL (ref 6–12)
POTASSIUM SERPL-SCNC: 4.5 MMOL/L (ref 3.5–5.2)
PROT SERPL-MCNC: 5.2 G/DL (ref 6–8.5)
PROTHROMBIN TIME: 11.8 SECONDS (ref 11.1–15.3)
QT INTERVAL: 330 MS
QTC INTERVAL: 421 MS
RBC # BLD AUTO: 4.59 10*6/MM3 (ref 4.14–5.8)
SODIUM SERPL-SCNC: 141 MMOL/L (ref 136–145)
TROPONIN T SERPL-MCNC: <0.01 NG/ML (ref 0–0.03)
WBC NRBC COR # BLD: 6.44 10*3/MM3 (ref 3.4–10.8)
WHOLE BLOOD HOLD COAG: NORMAL
WHOLE BLOOD HOLD SPECIMEN: NORMAL

## 2022-09-23 PROCEDURE — 70496 CT ANGIOGRAPHY HEAD: CPT

## 2022-09-23 PROCEDURE — 99284 EMERGENCY DEPT VISIT MOD MDM: CPT

## 2022-09-23 PROCEDURE — 36415 COLL VENOUS BLD VENIPUNCTURE: CPT

## 2022-09-23 PROCEDURE — 85730 THROMBOPLASTIN TIME PARTIAL: CPT | Performed by: NURSE PRACTITIONER

## 2022-09-23 PROCEDURE — 70450 CT HEAD/BRAIN W/O DYE: CPT

## 2022-09-23 PROCEDURE — 93010 ELECTROCARDIOGRAM REPORT: CPT | Performed by: INTERNAL MEDICINE

## 2022-09-23 PROCEDURE — 80053 COMPREHEN METABOLIC PANEL: CPT | Performed by: NURSE PRACTITIONER

## 2022-09-23 PROCEDURE — 70498 CT ANGIOGRAPHY NECK: CPT

## 2022-09-23 PROCEDURE — 84484 ASSAY OF TROPONIN QUANT: CPT | Performed by: NURSE PRACTITIONER

## 2022-09-23 PROCEDURE — 85025 COMPLETE CBC W/AUTO DIFF WBC: CPT | Performed by: NURSE PRACTITIONER

## 2022-09-23 PROCEDURE — 82962 GLUCOSE BLOOD TEST: CPT

## 2022-09-23 PROCEDURE — 71045 X-RAY EXAM CHEST 1 VIEW: CPT

## 2022-09-23 PROCEDURE — 93005 ELECTROCARDIOGRAM TRACING: CPT | Performed by: NURSE PRACTITIONER

## 2022-09-23 PROCEDURE — 0 IOPAMIDOL PER 1 ML: Performed by: STUDENT IN AN ORGANIZED HEALTH CARE EDUCATION/TRAINING PROGRAM

## 2022-09-23 PROCEDURE — 85610 PROTHROMBIN TIME: CPT | Performed by: NURSE PRACTITIONER

## 2022-09-23 RX ORDER — LISINOPRIL 10 MG/1
10 TABLET ORAL DAILY
COMMUNITY

## 2022-09-23 RX ORDER — SODIUM CHLORIDE 0.9 % (FLUSH) 0.9 %
10 SYRINGE (ML) INJECTION AS NEEDED
Status: DISCONTINUED | OUTPATIENT
Start: 2022-09-23 | End: 2022-09-23 | Stop reason: HOSPADM

## 2022-09-23 RX ADMIN — IOPAMIDOL 90 ML: 755 INJECTION, SOLUTION INTRAVENOUS at 11:08

## 2022-09-23 NOTE — ED PROVIDER NOTES
Subjective   History of Present Illness  Wife states around 0615 this AM she was having a hard time waking patient up. He was very lethargic. He states he did take Benadryl last night. He reports as he was trying to wake up he felt very disoriented. He thought his phone was on one side of the bed but it was on the other. He stats his whole right side (especially his right arm) felt very heavy and he couldn't get it to work. He states he thought he was standing up to get out of bed but couldn't get his body up so he rolled off the bed landing on the dog. He states at that point he could not walk and was trying to scoot across the floor to get to the rest room but couldn't get there and ended up urinating in the floor and on himself. He states his wife tried calling him and he couldn't figure out how to answer the phone - he kept hitting decline instead of answer. Once he finally answer the phone, the wife states he was crying and stated he needed help. She states at that time his speech wasn't normal for him. Patient is a T1D but states he did not check his BS this morning. He did take 15 units of his insulin and has been eating peanut butter crackers. He also reports he ran out of his BP medication (Lisinopril) about 3 days ago and due to insurance issues he could not get it filled. He states yesterday it became available for pickup. Patient states at this time he is feeling back to normal. Wife states he is doing much better does not feel he is quite back to 100%.         Review of Systems   Constitutional: Positive for fatigue. Negative for chills and fever.   HENT: Negative.    Respiratory: Negative for cough and shortness of breath.    Cardiovascular: Negative for chest pain.   Gastrointestinal: Negative for abdominal pain, diarrhea, nausea and vomiting.   Genitourinary: Negative.    Musculoskeletal: Negative.    Skin: Negative.    Neurological: Positive for speech difficulty, weakness and numbness. Negative for  "dizziness, light-headedness and headaches.   Psychiatric/Behavioral: Negative.        Past Medical History:   Diagnosis Date   • Depression    • Diabetes mellitus (HCC)    • Hypertension        Allergies   Allergen Reactions   • Penicillins        Past Surgical History:   Procedure Laterality Date   • ESOPHAGOSCOPY / EGD  11/09/2011    D[place] tear in esophagus. Epinephrine injection therapy applied to control bleeding. Area of erosion in body of stomach. Epinephrine injection therapy applied to control bleeding. Argon beam coagulation applied to control bleeding. Norm duod.       Family History   Problem Relation Age of Onset   • Diabetes Other    • Hypertension Other    • Diabetes Mother        Social History     Socioeconomic History   • Marital status:    Tobacco Use   • Smoking status: Never Smoker   • Smokeless tobacco: Current User     Types: Chew   Substance and Sexual Activity   • Alcohol use: Yes     Comment: SOCIAL   • Drug use: No   • Sexual activity: Defer           Objective    /77   Pulse 80   Temp 97.9 °F (36.6 °C) (Oral)   Resp 18   Ht 182.9 cm (72\")   Wt 97.3 kg (214 lb 9.6 oz)   SpO2 99%   BMI 29.10 kg/m²     Physical Exam  Vitals and nursing note reviewed.   Constitutional:       General: He is not in acute distress.     Appearance: He is well-developed. He is not ill-appearing.      Comments: Well fit, muscular male   HENT:      Head: Normocephalic and atraumatic.      Mouth/Throat:      Mouth: Mucous membranes are moist.      Pharynx: Oropharynx is clear.   Eyes:      General: No visual field deficit.     Extraocular Movements: Extraocular movements intact.   Cardiovascular:      Rate and Rhythm: Normal rate and regular rhythm.      Heart sounds: Normal heart sounds. No murmur heard.  Pulmonary:      Effort: Pulmonary effort is normal. No respiratory distress.      Breath sounds: Normal breath sounds. No wheezing.   Abdominal:      General: Bowel sounds are normal. " There is no distension.      Palpations: Abdomen is soft.      Tenderness: There is no abdominal tenderness.   Musculoskeletal:         General: Normal range of motion.      Cervical back: Normal range of motion and neck supple.   Skin:     General: Skin is warm and dry.      Capillary Refill: Capillary refill takes less than 2 seconds.   Neurological:      Mental Status: He is alert and oriented to person, place, and time.      GCS: GCS eye subscore is 4. GCS verbal subscore is 5. GCS motor subscore is 6.      Cranial Nerves: No cranial nerve deficit or facial asymmetry.      Sensory: No sensory deficit.      Motor: No weakness.      Coordination: Coordination normal.   Psychiatric:         Mood and Affect: Mood normal.         Speech: Speech normal.         Behavior: Behavior normal.         Thought Content: Thought content normal.         Judgment: Judgment normal.         ECG 12 Lead      Date/Time: 9/23/2022 11:13 AM  Performed by: Angie Mckeon APRN  Authorized by: Darian Samson MD   Interpreted by physician  Previous ECG: no previous ECG available  Rhythm: sinus rhythm  Rate: normal  BPM: 98  T depression: V4, V5, V6, aVF, III and II  Clinical impression: abnormal ECG  Comments: Nonspecific ST and T wave abnormality        Results for orders placed or performed during the hospital encounter of 09/23/22   Comprehensive Metabolic Panel    Specimen: Blood   Result Value Ref Range    Glucose 179 (H) 65 - 99 mg/dL    BUN 19 6 - 20 mg/dL    Creatinine 1.25 0.76 - 1.27 mg/dL    Sodium 141 136 - 145 mmol/L    Potassium 4.5 3.5 - 5.2 mmol/L    Chloride 106 98 - 107 mmol/L    CO2 28.0 22.0 - 29.0 mmol/L    Calcium 8.2 (L) 8.6 - 10.5 mg/dL    Total Protein 5.2 (L) 6.0 - 8.5 g/dL    Albumin 2.90 (L) 3.50 - 5.20 g/dL    ALT (SGPT) 38 1 - 41 U/L    AST (SGOT) 52 (H) 1 - 40 U/L    Alkaline Phosphatase 45 39 - 117 U/L    Total Bilirubin 0.3 0.0 - 1.2 mg/dL    Globulin 2.3 gm/dL    A/G Ratio 1.3 g/dL     BUN/Creatinine Ratio 15.2 7.0 - 25.0    Anion Gap 7.0 5.0 - 15.0 mmol/L    eGFR 78.0 >60.0 mL/min/1.73   Protime-INR    Specimen: Blood   Result Value Ref Range    Protime 11.8 11.1 - 15.3 Seconds    INR 0.88 0.80 - 1.20   aPTT    Specimen: Blood   Result Value Ref Range    PTT 26.6 20.0 - 40.3 seconds   Troponin    Specimen: Blood   Result Value Ref Range    Troponin T <0.010 0.000 - 0.030 ng/mL   CBC Auto Differential    Specimen: Blood   Result Value Ref Range    WBC 6.44 3.40 - 10.80 10*3/mm3    RBC 4.59 4.14 - 5.80 10*6/mm3    Hemoglobin 14.7 13.0 - 17.7 g/dL    Hematocrit 41.4 37.5 - 51.0 %    MCV 90.2 79.0 - 97.0 fL    MCH 32.0 26.6 - 33.0 pg    MCHC 35.5 31.5 - 35.7 g/dL    RDW 12.7 12.3 - 15.4 %    RDW-SD 41.4 37.0 - 54.0 fl    MPV 9.4 6.0 - 12.0 fL    Platelets 248 140 - 450 10*3/mm3    Neutrophil % 79.7 (H) 42.7 - 76.0 %    Lymphocyte % 13.4 (L) 19.6 - 45.3 %    Monocyte % 6.1 5.0 - 12.0 %    Eosinophil % 0.0 (L) 0.3 - 6.2 %    Basophil % 0.3 0.0 - 1.5 %    Immature Grans % 0.5 0.0 - 0.5 %    Neutrophils, Absolute 5.14 1.70 - 7.00 10*3/mm3    Lymphocytes, Absolute 0.86 0.70 - 3.10 10*3/mm3    Monocytes, Absolute 0.39 0.10 - 0.90 10*3/mm3    Eosinophils, Absolute 0.00 0.00 - 0.40 10*3/mm3    Basophils, Absolute 0.02 0.00 - 0.20 10*3/mm3    Immature Grans, Absolute 0.03 0.00 - 0.05 10*3/mm3    nRBC 0.0 0.0 - 0.2 /100 WBC   ECG 12 Lead   Result Value Ref Range    QT Interval 330 ms    QTC Interval 421 ms   Green Top (Gel)   Result Value Ref Range    Extra Tube Hold for add-ons.    Lavender Top   Result Value Ref Range    Extra Tube hold for add-on    Gold Top - SST   Result Value Ref Range    Extra Tube Hold for add-ons.    Light Blue Top   Result Value Ref Range    Extra Tube Hold for add-ons.      CT Angiogram Neck    Result Date: 9/23/2022  Narrative: PROCEDURE: CT HEAD ANGIOGRAPHY WITHOUT THEN WITH IV CONTRAST, CT NECK ANGIOGRAPHY WITHOUT THEN WITH IV CONTRAST CLINICAL HISTORY: Acute Stroke. COMPARISON:  Noncontrast head CT performed the same date. TECHNIQUE: CT angiography of the head and neck was performed with intravenous contrast on the level of the aortic arch to the vertex of the brain in orthogonal planes, along with 3D reconstruction of the arterial vasculature of the head and neck from the source images. Measurement of carotid stenosis is based on NASCET criteria which calculates the percentage of stenosis relative to the luminal diameter of the normal carotid artery distal to the stenosis. CONTRAST: 90 mL IV Isovue 370 This exam was performed using radiation doses that are as low as reasonably achievable (ALARA). This exam was performed according to our departmental dose optimization program, which includes automated exposure control, adjustment of the mA and/or KV according to patient size and/or use of iterative reconstruction technique. FINDINGS: There is no hemodynamically significant stenosis or dissection in the bilateral common carotid, bilateral proximal internal carotid or the visualized portions of the bilateral external carotid arteries. The carotid bulbs are without significant stenosis. There is normal takeoff of the great vessels from the aortic arch. The bilateral vertebral arteries appear patent with no evidence of dissection on either side.  The basilar tip appears normal. There is no hemodynamically significant stenosis or a focal aneurysm formation, in the intracranial portions of the bilateral internal carotid arteries, bilateral carotid siphons, bilateral anterior, middle and posterior cerebral arteries and their visualized branches. Limited evaluation of the dural sinuses and the intracranial and venous system shows a widely patent superior sagittal sinus, straight sinus and bilateral internal cerebral veins. The lung apices 4 x 3 x 4 mm nodule along the left major fissure, likely a fissural lymph node. The cervical spine appears unremarkable.     Impression: Unremarkable exam. 4 x 3  x 4 mm nodule along the left major fissure, likely a benign fissural lymph node. If the patient is at high risk for lung cancer, an optional follow-up chest CT in 12 months could be considered. Electronically signed by:  Jeffry Carter MD  9/23/2022 12:54 PM CDT Workstation: [id number]    XR Chest 1 View    Result Date: 9/23/2022  Narrative: Chest x-ray single view. CLINICAL INDICATION: Shortness of breath. Acute stroke protocol. COMPARISON: Chest September 8, 2020. FINDINGS: Cardiac silhouette is normal in size. Pulmonary vascularity is unremarkable. No focal infiltrate or consolidation.  No pleural effusion.  No pneumothorax.     Impression: No evidence of active disease. Electronically signed by:  Destin Harrington MD  9/23/2022 11:11 AM CDT Workstation: [id number]    CT Head Without Contrast Stroke Protocol    Result Date: 9/23/2022  Narrative: PROCEDURE: CT head without contrast REASON FOR EXAM: Neuro deficit, acute, stroke suspected This exam was performed according to our departmental dose-optimization program, which includes automated exposure control, adjustment of the mA and/or kV according to patient size and/or use of iterative reconstruction technique. FINDINGS: Axial computer tomography sequential imaging of the head was performed from the vertex to the base of the skull. .Sagittal and coronal reformation was performed . The skull vault is intact. Paranasal sinuses and bilateral mastoid air cells are well aerated. Cerebral and cerebellar parenchymal are normal. Ventricular system and subarachnoid spaces are normal.     Impression: Normal CT of the head. Electronically signed by:  Jacob Rivas MD  9/23/2022 12:48 PM CDT Workstation: [id number]    CT Angiogram Head w AI Analysis of LVO    Result Date: 9/23/2022  Narrative: PROCEDURE: CT HEAD ANGIOGRAPHY WITHOUT THEN WITH IV CONTRAST, CT NECK ANGIOGRAPHY WITHOUT THEN WITH IV CONTRAST CLINICAL HISTORY: Acute Stroke. COMPARISON: Noncontrast head CT performed the  same date. TECHNIQUE: CT angiography of the head and neck was performed with intravenous contrast on the level of the aortic arch to the vertex of the brain in orthogonal planes, along with 3D reconstruction of the arterial vasculature of the head and neck from the source images. Measurement of carotid stenosis is based on NASCET criteria which calculates the percentage of stenosis relative to the luminal diameter of the normal carotid artery distal to the stenosis. CONTRAST: 90 mL IV Isovue 370 This exam was performed using radiation doses that are as low as reasonably achievable (ALARA). This exam was performed according to our departmental dose optimization program, which includes automated exposure control, adjustment of the mA and/or KV according to patient size and/or use of iterative reconstruction technique. FINDINGS: There is no hemodynamically significant stenosis or dissection in the bilateral common carotid, bilateral proximal internal carotid or the visualized portions of the bilateral external carotid arteries. The carotid bulbs are without significant stenosis. There is normal takeoff of the great vessels from the aortic arch. The bilateral vertebral arteries appear patent with no evidence of dissection on either side.  The basilar tip appears normal. There is no hemodynamically significant stenosis or a focal aneurysm formation, in the intracranial portions of the bilateral internal carotid arteries, bilateral carotid siphons, bilateral anterior, middle and posterior cerebral arteries and their visualized branches. Limited evaluation of the dural sinuses and the intracranial and venous system shows a widely patent superior sagittal sinus, straight sinus and bilateral internal cerebral veins. The lung apices 4 x 3 x 4 mm nodule along the left major fissure, likely a fissural lymph node. The cervical spine appears unremarkable.     Impression: Unremarkable exam. 4 x 3 x 4 mm nodule along the left major  fissure, likely a benign fissural lymph node. If the patient is at high risk for lung cancer, an optional follow-up chest CT in 12 months could be considered. Electronically signed by:  Jeffry Carter MD  9/23/2022 12:54 PM CDT Workstation: [id number]             ED Course  ED Course as of 09/23/22 1329   Fri Sep 23, 2022   1051 Attempted to call Dr. Horne. Left message.  [SH]   1055 Spoke with Dr. Horne. Patient is back to normal at this time. Advised to obtain CT head and neck and if normal can reassure patient and have him come back if symptoms return. Since patient is back to baseline currently, no MRI needed at this time.  [SH]   1312 Spoke back with Dr. Horne regarding CT finding. Advised to reassure patient and if symptoms return will follow through with MRI.  [SH]   1315 Patient continues to be back at base line. No c/o at this time. Felling back to normal. Work up is unremarkable. Discussed CT finding of enlarged fissural in lung with CT follow up in 1 year to be completed by PCP. Patient verbalized understanding. Also advised to have EKG repeated outpatient for follow up.  [SH]      ED Course User Index  [SH] Angie Mckeon, GERA                                           [facility]    Final diagnoses:   Altered mental status, unspecified altered mental status type       ED Disposition  ED Disposition     ED Disposition   Discharge    Condition   Stable    Comment   --             Rosalee Miller, GERA  [address] [facility] [address]  [phone]    Schedule an appointment as soon as possible for a visit   ER follow up         Medication List      No changes were made to your prescriptions during this visit.          Angie Mckeon APRN  09/23/22 0717

## 2022-09-23 NOTE — ED NOTES
Patient arrives to the ED with complaints of right sided weakness this morning when he tried to exit his bed to go to the restroom. Patient states he was disoriented, dizzy, and unaware of what was going on around him. Patient states he had an incontinent episode of urine this AM. Patient wife stated he was extremely difficult to wake up at 0615 this morning. Provider at bedside.

## 2022-09-23 NOTE — DISCHARGE INSTRUCTIONS
Return back to the ER if your symptoms return. Keep a good check on your blood sugar over the next several days. Follow up with your primary care provider regarding your ER visit, CT findings, and EKG.

## 2022-09-24 LAB
GLUCOSE BLDC GLUCOMTR-MCNC: 140 MG/DL (ref 70–130)
GLUCOSE BLDC GLUCOMTR-MCNC: 146 MG/DL (ref 70–130)

## 2022-09-30 ENCOUNTER — LAB (OUTPATIENT)
Dept: LAB | Facility: HOSPITAL | Age: 32
End: 2022-09-30

## 2022-09-30 ENCOUNTER — OFFICE VISIT (OUTPATIENT)
Dept: FAMILY MEDICINE CLINIC | Facility: CLINIC | Age: 32
End: 2022-09-30

## 2022-09-30 VITALS
HEART RATE: 102 BPM | SYSTOLIC BLOOD PRESSURE: 160 MMHG | HEIGHT: 72 IN | DIASTOLIC BLOOD PRESSURE: 100 MMHG | OXYGEN SATURATION: 97 % | TEMPERATURE: 97.7 F | BODY MASS INDEX: 27.33 KG/M2 | WEIGHT: 201.8 LBS

## 2022-09-30 DIAGNOSIS — R53.1 ACUTE RIGHT-SIDED WEAKNESS: Primary | ICD-10-CM

## 2022-09-30 DIAGNOSIS — E10.65 CONTROLLED TYPE 1 DIABETES MELLITUS WITH HYPERGLYCEMIA: ICD-10-CM

## 2022-09-30 LAB
CHOLEST SERPL-MCNC: 230 MG/DL (ref 0–200)
HDLC SERPL-MCNC: 52 MG/DL (ref 40–60)
LDLC SERPL CALC-MCNC: 160 MG/DL (ref 0–100)
LDLC/HDLC SERPL: 3.04 {RATIO}
TRIGL SERPL-MCNC: 99 MG/DL (ref 0–150)
VLDLC SERPL-MCNC: 18 MG/DL (ref 5–40)

## 2022-09-30 PROCEDURE — 83036 HEMOGLOBIN GLYCOSYLATED A1C: CPT

## 2022-09-30 PROCEDURE — 36415 COLL VENOUS BLD VENIPUNCTURE: CPT

## 2022-09-30 PROCEDURE — 82043 UR ALBUMIN QUANTITATIVE: CPT

## 2022-09-30 PROCEDURE — 80061 LIPID PANEL: CPT

## 2022-09-30 PROCEDURE — 99213 OFFICE O/P EST LOW 20 MIN: CPT | Performed by: STUDENT IN AN ORGANIZED HEALTH CARE EDUCATION/TRAINING PROGRAM

## 2022-09-30 PROCEDURE — 95250 CONT GLUC MNTR PHYS/QHP EQP: CPT | Performed by: STUDENT IN AN ORGANIZED HEALTH CARE EDUCATION/TRAINING PROGRAM

## 2022-09-30 RX ORDER — FLASH GLUCOSE SENSOR
1 KIT MISCELLANEOUS
Qty: 4 EACH | Refills: 1 | Status: SHIPPED | OUTPATIENT
Start: 2022-09-30 | End: 2022-10-03

## 2022-10-01 LAB
ALBUMIN UR-MCNC: 165.2 MG/DL
HBA1C MFR BLD: 7.7 % (ref 4.8–5.6)

## 2022-10-03 DIAGNOSIS — E10.9 TYPE 1 DIABETES MELLITUS WITHOUT COMPLICATION: Primary | ICD-10-CM

## 2022-10-03 RX ORDER — PROCHLORPERAZINE 25 MG/1
1 SUPPOSITORY RECTAL CONTINUOUS
Qty: 1 EACH | Refills: 0 | Status: SHIPPED | OUTPATIENT
Start: 2022-10-03

## 2022-10-03 RX ORDER — PROCHLORPERAZINE 25 MG/1
1 SUPPOSITORY RECTAL
Qty: 1 EACH | Refills: 3 | Status: SHIPPED | OUTPATIENT
Start: 2022-10-03

## 2022-10-03 RX ORDER — PROCHLORPERAZINE 25 MG/1
SUPPOSITORY RECTAL
Qty: 3 EACH | Refills: 11 | Status: SHIPPED | OUTPATIENT
Start: 2022-10-03

## 2022-10-04 NOTE — PROGRESS NOTES
I have seen the patient.  I have reviewed the notes, assessments, and/or procedures performed by Mason Presley MD during office visit I concur with her/his documentation and assessment and plan for Woody Pelletier.            This document has been electronically signed by Loli Tripp MD on October 4, 2022 14:33 CDT

## 2022-10-04 NOTE — PROGRESS NOTES
Family Medicine Residency  Mason Presley MD    Subjective:     Woody Pelletier is a 31 y.o. male who presents to [facility] care after an ED visit.  Patient noted that he had profound right-sided weakness and paresthesia.  This is happened on multiple occasions recently.  He is also had periods where he seems to stare off and does not respond appropriately to verbal stimuli.  During these episodes, patient has been unable to walk.  His past medical history is significant for diabetes and hypertension.  He is recently changed his diabetic regimen so as to not have any low blood sugars when he wakes up.  Patient reports a previous history of epilepsy.  No generalized convulsions during these time periods.  Work-up in the emergency department included a head CT that did not show any acute intracranial process.  Lab work was grossly unremarkable from the emergency department.    The following portions of the patient's history were reviewed and updated as appropriate: allergies, current medications, past family history, past medical history, past social history, past surgical history and problem list.    Past Medical Hx:  Past Medical History:   Diagnosis Date   • Depression    • Diabetes mellitus (HCC)    • Hypertension        Past Surgical Hx:  Past Surgical History:   Procedure Laterality Date   • ESOPHAGOSCOPY / EGD  11/09/2011    Daja-De Leon tear in esophagus. Epinephrine injection therapy applied to control bleeding. Area of erosion in body of stomach. Epinephrine injection therapy applied to control bleeding. Argon beam coagulation applied to control bleeding. Norm duod.       Current Meds:    Current Outpatient Medications:   •  Continuous Blood Gluc  (Dexcom G6 ) device, 1 each Continuous., Disp: 1 each, Rfl: 0  •  Continuous Blood Gluc Sensor (Dexcom G6 Sensor), Every 10 (Ten) Days., Disp: 3 each, Rfl: 11  •  Continuous Blood Gluc Transmit (Dexcom G6 Transmitter) misc, 1 each Every 3  (Three) Months., Disp: 1 each, Rfl: 3  •  Insulin Glargine (TOUJEO SOLOSTAR) 300 UNIT/ML solution pen-injector, Inject 30 Units under the skin Daily., Disp: 2 pen, Rfl: 11  •  insulin lispro (humaLOG) 100 UNIT/ML injection, Inject  under the skin into the appropriate area as directed 3 (Three) Times a Day., Disp: , Rfl:   •  Insulin Pen Needle (B-D UF III MINI PEN NEEDLES) 31G X 5 MM misc, 1 each 4 (Four) Times a Day. Use with insulin pens, Disp: 200 each, Rfl: 11  •  lisinopril (PRINIVIL,ZESTRIL) 10 MG tablet, Take 10 mg by mouth Daily., Disp: , Rfl:   •  NOVOLIN R 100 UNIT/ML injection, ADMINISTER 10 UNITS UNDER THE SKIN THREE TIMES DAILY BEFORE MEALS, Disp: 10 mL, Rfl: 0  •  omeprazole (priLOSEC) 20 MG capsule, TK 1 C PO BID, Disp: , Rfl: 5    Allergies:  Allergies   Allergen Reactions   • Penicillins        Family Hx:  Family History   Problem Relation Age of Onset   • Diabetes Other    • Hypertension Other    • Diabetes Mother         Social History:  Social History     Socioeconomic History   • Marital status:    Tobacco Use   • Smoking status: Never Smoker   • Smokeless tobacco: Current User     Types: Chew   Substance and Sexual Activity   • Alcohol use: Yes     Comment: SOCIAL   • Drug use: No   • Sexual activity: Defer       Review of Systems  Review of Systems   Constitutional: Negative for chills, fatigue and fever.   HENT: Negative for ear pain, hearing loss, postnasal drip and sore throat.    Eyes: Negative for photophobia and pain.   Respiratory: Negative for cough, shortness of breath and wheezing.    Cardiovascular: Negative for chest pain, palpitations and leg swelling.   Gastrointestinal: Negative for abdominal pain, diarrhea, nausea and vomiting.   Genitourinary: Negative for difficulty urinating and dysuria.   Musculoskeletal: Negative for arthralgias and myalgias.   Skin: Negative for rash and wound.   Neurological: Positive for dizziness, speech difficulty, weakness and  "light-headedness. Negative for syncope, facial asymmetry and headaches.   Psychiatric/Behavioral: Negative for dysphoric mood. The patient is not nervous/anxious.        Objective:     /100   Pulse 102   Temp 97.7 °F (36.5 °C)   Ht 182.9 cm (72\")   Wt 91.5 kg (201 lb 12.8 oz)   SpO2 97%   BMI 27.37 kg/m²   Physical Exam  Vitals reviewed.   Constitutional:       General: He is not in acute distress.  HENT:      Head: Normocephalic and atraumatic.      Mouth/Throat:      Pharynx: No oropharyngeal exudate.   Eyes:      General: No scleral icterus.     Conjunctiva/sclera: Conjunctivae normal.      Pupils: Pupils are equal, round, and reactive to light.   Cardiovascular:      Rate and Rhythm: Normal rate and regular rhythm.      Heart sounds: Normal heart sounds.   Pulmonary:      Effort: Pulmonary effort is normal.      Breath sounds: Normal breath sounds. No wheezing or rales.   Abdominal:      General: Bowel sounds are normal. There is no distension.      Palpations: Abdomen is soft.      Tenderness: There is no abdominal tenderness.   Musculoskeletal:         General: No deformity.   Skin:     General: Skin is warm and dry.      Capillary Refill: Capillary refill takes less than 2 seconds.   Neurological:      General: No focal deficit present.      Mental Status: He is alert and oriented to person, place, and time.      GCS: GCS eye subscore is 4. GCS verbal subscore is 5. GCS motor subscore is 6.      Cranial Nerves: No cranial nerve deficit, dysarthria or facial asymmetry.      Sensory: No sensory deficit.      Motor: No weakness, atrophy or seizure activity.      Gait: Gait normal.          Assessment/Plan:     Diagnoses and all orders for this visit:    1. Acute right-sided weakness (Primary)  -     Ambulatory Referral to Neurology  -     MRI Brain With & Without Contrast; Future    2. Controlled type 1 diabetes mellitus with hyperglycemia (HCC)  -     Hemoglobin A1c; Future  -     Discontinue: " Continuous Blood Gluc Sensor (FreeStyle Eunice 14 Day Sensor) misc; 1 each Every 14 (Fourteen) Days.  Dispense: 4 each; Refill: 1  -     Lipid panel; Future  -     MicroAlbumin, Urine, Random - Urine, Clean Catch; Future    1.  I am concerned that there is a neurologic process potentially a focal seizure involved with this intermittent right-sided weakness and paresthesia.  Urgent referral placed to neurology.  There is a questionable postictal period of time.  Patient does have a past history of seizures.  Patient given warning not to drive until his neurologic visit and clearance from neurology.  Ordered MRI as start of work-up to hopefully expedite neurologic work-up.    2.  Start monitoring patient for diabetes.  Placed freestyle eunice in office.  Patient reports recent diabetic eye exam.    Freestyle eunice 14-day installed in office.  Patient was educated on proper usage and all questions answered.  Goal range of .  Goal time in range of 70%.  Told to check sugars at least every 8 hours but preferably in the morning, before meals, and 2 hours postprandial.      · Rx changes: none  · Patient Education: types of seizures  · Compliance at present is estimated to be fair.   · Efforts to improve compliance (if necessary) will be directed at close follow up.    Depression screening: Up to date; last screen 9/30/2022     Follow-up:     No follow-ups on file.    Preventative:  Health Maintenance   Topic Date Due   • COVID-19 Vaccine (1) Never done   • ANNUAL PHYSICAL  Never done   • Pneumococcal Vaccine 0-64 (1 - PCV) Never done   • Hepatitis B (1 of 3 - Risk 3-dose series) Never done   • TDAP/TD VACCINES (1 - Tdap) Never done   • HEPATITIS C SCREENING  Never done   • DIABETIC FOOT EXAM  Never done   • DIABETIC EYE EXAM  Never done   • INFLUENZA VACCINE  Never done   • HEMOGLOBIN A1C  03/30/2023   • URINE MICROALBUMIN  09/30/2023     Weight  -Class: Overweight: 25.0-29.9kg/m2   -BMI is >= 25 and <30. (Overweight)  The following options were offered after discussion;: exercise counseling/recommendations and nutrition counseling/recommendations   eat more fruits and vegetables, decrease soda or juice intake, increase water intake and increase physical activity    Alcohol use:  reports current alcohol use.  Nicotine status  reports that he has never smoked. His smokeless tobacco use includes chew.     Goals    None         RISK SCORE: 3       Mason Presley MD   [place]    [place] [address]  Office: [phone]    This document has been electronically signed by Mason Presley MD on October 4, 2022 13:44 CDT

## 2022-10-11 ENCOUNTER — APPOINTMENT (OUTPATIENT)
Dept: MRI IMAGING | Facility: HOSPITAL | Age: 32
End: 2022-10-11

## 2022-10-13 ENCOUNTER — OFFICE VISIT (OUTPATIENT)
Dept: FAMILY MEDICINE CLINIC | Facility: CLINIC | Age: 32
End: 2022-10-13

## 2022-10-13 VITALS
SYSTOLIC BLOOD PRESSURE: 128 MMHG | HEART RATE: 89 BPM | DIASTOLIC BLOOD PRESSURE: 78 MMHG | BODY MASS INDEX: 29.57 KG/M2 | OXYGEN SATURATION: 98 % | HEIGHT: 72 IN | TEMPERATURE: 98 F | WEIGHT: 218.3 LBS

## 2022-10-13 DIAGNOSIS — Z11.59 ENCOUNTER FOR HEPATITIS C SCREENING TEST FOR LOW RISK PATIENT: ICD-10-CM

## 2022-10-13 DIAGNOSIS — G47.00 INSOMNIA, UNSPECIFIED TYPE: ICD-10-CM

## 2022-10-13 DIAGNOSIS — E10.9 TYPE 1 DIABETES MELLITUS WITHOUT COMPLICATION: Primary | ICD-10-CM

## 2022-10-13 PROCEDURE — 99213 OFFICE O/P EST LOW 20 MIN: CPT | Performed by: STUDENT IN AN ORGANIZED HEALTH CARE EDUCATION/TRAINING PROGRAM

## 2022-10-13 RX ORDER — SYRING-NEEDL,DISP,INSUL,0.3 ML 30 GX5/16"
SYRINGE, EMPTY DISPOSABLE MISCELLANEOUS
COMMUNITY
Start: 2022-10-08 | End: 2022-10-13 | Stop reason: SDUPTHER

## 2022-10-13 RX ORDER — INSULIN ASPART 100 [IU]/ML
INJECTION, SOLUTION INTRAVENOUS; SUBCUTANEOUS
Qty: 10 ML | Refills: 12 | Status: SHIPPED | OUTPATIENT
Start: 2022-10-13

## 2022-10-13 RX ORDER — TRAZODONE HYDROCHLORIDE 50 MG/1
50 TABLET ORAL NIGHTLY
Qty: 30 TABLET | Refills: 1 | Status: SHIPPED | OUTPATIENT
Start: 2022-10-13 | End: 2023-01-05 | Stop reason: SDUPTHER

## 2022-10-13 RX ORDER — INSULIN GLARGINE 100 [IU]/ML
INJECTION, SOLUTION SUBCUTANEOUS
COMMUNITY
Start: 2022-09-24 | End: 2022-10-13 | Stop reason: SDUPTHER

## 2022-10-13 RX ORDER — HYDROCODONE BITARTRATE AND ACETAMINOPHEN 5; 325 MG/1; MG/1
TABLET ORAL
COMMUNITY
Start: 2022-10-03

## 2022-10-13 RX ORDER — LISINOPRIL AND HYDROCHLOROTHIAZIDE 12.5; 1 MG/1; MG/1
1 TABLET ORAL DAILY
COMMUNITY
Start: 2022-09-20 | End: 2022-12-15 | Stop reason: SDUPTHER

## 2022-10-13 RX ORDER — INSULIN GLARGINE 100 [IU]/ML
INJECTION, SOLUTION SUBCUTANEOUS
Qty: 10 ML | Refills: 12 | Status: SHIPPED | OUTPATIENT
Start: 2022-10-13

## 2022-10-13 NOTE — PROGRESS NOTES
Family Medicine Residency  Mason Presley MD    Subjective:     Woody Pelletier is a 31 y.o. male who presents for weakness, DMT1, sleep difficulties.    Weakness: Patient has not had a recurrence of unilateral weakness/paresthesia in the last 2 weeks. MRI was ordered but delayed currently. Neurology referral placed last visit however patient has not been contacted yet.     DMT1: Patient taking 30 units long acting, and approximately 25 units short acting. Last A1c was 7.7. Having issues with insurance coverage. Patient has not had an eye exam in the last year. Was on freestlye but was switched to dexcom. Had a couple of lows but responds to small snacks. Patient is very active and wants to maintain/build muscle mass. High caloric diet, low on carbs.     Sleep difficulties: Patient averages 3-4 hours a night. Falling asleep is the main issue. Previously used benadryl for this. Has not tired other agents.     The following portions of the patient's history were reviewed and updated as appropriate: allergies, current medications, past family history, past medical history, past social history, past surgical history and problem list.    Past Medical Hx:  Past Medical History:   Diagnosis Date   • Depression    • Diabetes mellitus (HCC)    • Hypertension        Past Surgical Hx:  Past Surgical History:   Procedure Laterality Date   • ESOPHAGOSCOPY / EGD  11/09/2011    Daja-De Leon tear in esophagus. Epinephrine injection therapy applied to control bleeding. Area of erosion in body of stomach. Epinephrine injection therapy applied to control bleeding. Argon beam coagulation applied to control bleeding. Norm duod.       Current Meds:    Current Outpatient Medications:   •  Continuous Blood Gluc  (Dexcom G6 ) device, 1 each Continuous., Disp: 1 each, Rfl: 0  •  Continuous Blood Gluc Sensor (Dexcom G6 Sensor), Every 10 (Ten) Days., Disp: 3 each, Rfl: 11  •  Continuous Blood Gluc Transmit (Dexcom G6  "Transmitter) misc, 1 each Every 3 (Three) Months., Disp: 1 each, Rfl: 3  •  HYDROcodone-acetaminophen (NORCO) 5-325 MG per tablet, TAKE 1 TABLET BY MOUTH EVERY 4-6 HOURS AS NEEDED FOR PAIN, Disp: , Rfl:   •  Insulin Aspart (NovoLOG) 100 UNIT/ML injection, Use up to 25 units with each meal. Check dexcom before and 1 hour after., Disp: 10 mL, Rfl: 12  •  insulin glargine (Lantus) 100 UNIT/ML injection, Use 30 units nightly, Disp: 10 mL, Rfl: 12  •  Insulin Pen Needle (B-D UF III MINI PEN NEEDLES) 31G X 5 MM misc, 1 each 4 (Four) Times a Day. Use with insulin pens, Disp: 200 each, Rfl: 11  •  Insulin Syringe-Needle U-100 32G X 5/16\" 0.5 ML misc, 1 each 4 (Four) Times a Day., Disp: 200 each, Rfl: 8  •  lisinopril (PRINIVIL,ZESTRIL) 10 MG tablet, Take 10 mg by mouth Daily., Disp: , Rfl:   •  lisinopril-hydrochlorothiazide (PRINZIDE,ZESTORETIC) 10-12.5 MG per tablet, Take 1 tablet by mouth Daily., Disp: , Rfl:   •  omeprazole (priLOSEC) 20 MG capsule, TK 1 C PO BID, Disp: , Rfl: 5  •  traZODone (DESYREL) 50 MG tablet, Take 1 tablet by mouth Every Night., Disp: 30 tablet, Rfl: 1    Allergies:  Allergies   Allergen Reactions   • Penicillins        Family Hx:  Family History   Problem Relation Age of Onset   • Diabetes Other    • Hypertension Other    • Diabetes Mother         Social History:  Social History     Socioeconomic History   • Marital status:    Tobacco Use   • Smoking status: Never   • Smokeless tobacco: Current     Types: Chew   Substance and Sexual Activity   • Alcohol use: Yes     Comment: SOCIAL   • Drug use: No   • Sexual activity: Defer       Review of Systems  Review of Systems   Constitutional: Negative for chills, fatigue and fever.   HENT: Negative for ear pain, hearing loss, postnasal drip and sore throat.    Eyes: Negative for photophobia and pain.   Respiratory: Negative for cough, shortness of breath and wheezing.    Cardiovascular: Negative for chest pain, palpitations and leg swelling. " "  Gastrointestinal: Negative for abdominal pain, diarrhea, nausea and vomiting.   Genitourinary: Negative for difficulty urinating and dysuria.   Musculoskeletal: Negative for arthralgias and myalgias.   Skin: Negative for rash and wound.   Neurological: Negative for dizziness, syncope, facial asymmetry, speech difficulty, weakness, light-headedness and headaches.   Psychiatric/Behavioral: Negative for dysphoric mood. The patient is not nervous/anxious.        Objective:     /78   Pulse 89   Temp 98 °F (36.7 °C)   Ht 182.9 cm (72\")   Wt 99 kg (218 lb 4.8 oz)   SpO2 98%   BMI 29.61 kg/m²   Physical Exam  Vitals reviewed.   Constitutional:       General: He is not in acute distress.  HENT:      Head: Normocephalic and atraumatic.      Mouth/Throat:      Pharynx: No oropharyngeal exudate.   Eyes:      General: No scleral icterus.     Conjunctiva/sclera: Conjunctivae normal.      Pupils: Pupils are equal, round, and reactive to light.   Cardiovascular:      Rate and Rhythm: Normal rate and regular rhythm.      Heart sounds: Normal heart sounds.   Pulmonary:      Effort: Pulmonary effort is normal.      Breath sounds: Normal breath sounds. No wheezing or rales.   Abdominal:      General: Bowel sounds are normal. There is no distension.      Palpations: Abdomen is soft.      Tenderness: There is no abdominal tenderness.   Musculoskeletal:         General: No deformity.   Skin:     General: Skin is warm and dry.      Capillary Refill: Capillary refill takes less than 2 seconds.   Neurological:      General: No focal deficit present.      Mental Status: He is alert and oriented to person, place, and time.      GCS: GCS eye subscore is 4. GCS verbal subscore is 5. GCS motor subscore is 6.      Cranial Nerves: No cranial nerve deficit, dysarthria or facial asymmetry.      Sensory: No sensory deficit.      Motor: No weakness, atrophy or seizure activity.      Gait: Gait normal.          Assessment/Plan: " "    Diagnoses and all orders for this visit:    1. Type 1 diabetes mellitus without complication (HCC) (Primary)  -     Ambulatory Referral for Diabetic Eye Exam-Ophthalmology  -     Ambulatory Referral to Nutrition Services  -     insulin glargine (Lantus) 100 UNIT/ML injection; Use 30 units nightly  Dispense: 10 mL; Refill: 12  -     Insulin Syringe-Needle U-100 32G X 5/16\" 0.5 ML misc; 1 each 4 (Four) Times a Day.  Dispense: 200 each; Refill: 8  -     Insulin Aspart (NovoLOG) 100 UNIT/ML injection; Use up to 25 units with each meal. Check dexcom before and 1 hour after.  Dispense: 10 mL; Refill: 12    2. Encounter for hepatitis C screening test for low risk patient  -     Hepatitis C antibody; Future    3. Insomnia, unspecified type  -     traZODone (DESYREL) 50 MG tablet; Take 1 tablet by mouth Every Night.  Dispense: 30 tablet; Refill: 1    1. Referral to optho and diabetic educator. Refill insulin. Replaced dexcom in office.     2. Due for hep c screening    3. Trial of trazodone. Can increase if needed      · Rx changes: none  · Patient Education: DM screening  · Compliance at present is estimated to be fair.   · Efforts to improve compliance (if necessary) will be directed at close follow up.    Depression screening: Up to date; last screen 9/30/2022     Follow-up:     Return in about 4 weeks (around 11/10/2022).    Preventative:  Health Maintenance   Topic Date Due   • Hepatitis B (1 of 3 - 3-dose series) Never done   • COVID-19 Vaccine (1) Never done   • ANNUAL PHYSICAL  Never done   • Pneumococcal Vaccine 0-64 (1 - PCV) Never done   • TDAP/TD VACCINES (1 - Tdap) Never done   • HEPATITIS C SCREENING  Never done   • DIABETIC FOOT EXAM  Never done   • DIABETIC EYE EXAM  Never done   • INFLUENZA VACCINE  Never done   • HEMOGLOBIN A1C  03/30/2023   • URINE MICROALBUMIN  09/30/2023     Weight  -Class: Overweight: 25.0-29.9kg/m2   -BMI is >= 25 and <30. (Overweight) The following options were offered after " discussion;: exercise counseling/recommendations and nutrition counseling/recommendations   eat more fruits and vegetables, decrease soda or juice intake, increase water intake and increase physical activity    Alcohol use:  reports current alcohol use.  Nicotine status  reports that he has never smoked. His smokeless tobacco use includes chew.     Goals    None         RISK SCORE: 3       Mason Presley MD   [facility]Y[place]    [place] [address]  Office: [phone]    This document has been electronically signed by Mason Presley MD on October 13, 2022 14:18 CDT

## 2022-10-17 ENCOUNTER — TELEPHONE (OUTPATIENT)
Dept: FAMILY MEDICINE CLINIC | Facility: CLINIC | Age: 32
End: 2022-10-17

## 2022-10-17 NOTE — PROGRESS NOTES
I have seen the patient.  I have reviewed the notes, assessments, and/or procedures performed by Mason Presley MD during office visit I concur with her/his documentation and assessment and plan for Woody Pelletier.            This document has been electronically signed by Loli Tripp MD on October 17, 2022 09:21 CDT

## 2022-10-17 NOTE — TELEPHONE ENCOUNTER
Patient called wanting to speak to someone about his Dexcom sensor. He stated he accidentally pulled it off and then tried to put it back on. He would like for someone to call him back.    His#[phone]

## 2022-10-25 ENCOUNTER — TELEPHONE (OUTPATIENT)
Dept: GENERAL RADIOLOGY | Facility: HOSPITAL | Age: 32
End: 2022-10-25

## 2022-11-16 ENCOUNTER — OFFICE VISIT (OUTPATIENT)
Dept: FAMILY MEDICINE CLINIC | Facility: CLINIC | Age: 32
End: 2022-11-16

## 2022-11-16 VITALS
SYSTOLIC BLOOD PRESSURE: 150 MMHG | OXYGEN SATURATION: 99 % | HEART RATE: 87 BPM | BODY MASS INDEX: 29.92 KG/M2 | WEIGHT: 220.9 LBS | HEIGHT: 72 IN | DIASTOLIC BLOOD PRESSURE: 92 MMHG | TEMPERATURE: 98 F

## 2022-11-16 DIAGNOSIS — E10.9 TYPE 1 DIABETES MELLITUS WITHOUT COMPLICATION: Primary | ICD-10-CM

## 2022-11-16 PROCEDURE — 99213 OFFICE O/P EST LOW 20 MIN: CPT | Performed by: STUDENT IN AN ORGANIZED HEALTH CARE EDUCATION/TRAINING PROGRAM

## 2022-11-21 NOTE — PROGRESS NOTES
Family Medicine Residency  Mason Presley MD    Subjective:     Woody Pelletier is a 32 y.o. male who presents for DMT1    DMT1: Patient taking 30 units long acting, and approximately 25 units short acting. Last A1c was 7.7. Patient has not had an eye exam in the last year. Was on freestlye but was switched to dexcom because it stays on his arm better. Had a couple of lows but responds to small snacks. Patient is very active and wants to maintain/build muscle mass. High caloric diet, low on carbs. Dexcom has made things easier and seems to work better with his lifestyle. Some issues with alarms.       The following portions of the patient's history were reviewed and updated as appropriate: allergies, current medications, past family history, past medical history, past social history, past surgical history and problem list.    Past Medical Hx:  Past Medical History:   Diagnosis Date   • Depression    • Diabetes mellitus (HCC)    • Hypertension        Past Surgical Hx:  Past Surgical History:   Procedure Laterality Date   • ESOPHAGOSCOPY / EGD  11/09/2011    [place] tear in esophagus. Epinephrine injection therapy applied to control bleeding. Area of erosion in body of stomach. Epinephrine injection therapy applied to control bleeding. Argon beam coagulation applied to control bleeding. Norm duod.       Current Meds:    Current Outpatient Medications:   •  Continuous Blood Gluc  (Dexcom G6 ) device, 1 each Continuous., Disp: 1 each, Rfl: 0  •  Continuous Blood Gluc Sensor (Dexcom G6 Sensor), Every 10 (Ten) Days., Disp: 3 each, Rfl: 11  •  Continuous Blood Gluc Transmit (Dexcom G6 Transmitter) misc, 1 each Every 3 (Three) Months., Disp: 1 each, Rfl: 3  •  HYDROcodone-acetaminophen (NORCO) 5-325 MG per tablet, TAKE 1 TABLET BY MOUTH EVERY 4-6 HOURS AS NEEDED FOR PAIN, Disp: , Rfl:   •  Insulin Aspart (NovoLOG) 100 UNIT/ML injection, Use up to 25 units with each meal. Check dexcom before  "and 1 hour after., Disp: 10 mL, Rfl: 12  •  insulin glargine (Lantus) 100 UNIT/ML injection, Use 30 units nightly, Disp: 10 mL, Rfl: 12  •  Insulin Pen Needle (B-D UF III MINI PEN NEEDLES) 31G X 5 MM misc, 1 each 4 (Four) Times a Day. Use with insulin pens, Disp: 200 each, Rfl: 11  •  Insulin Syringe-Needle U-100 32G X 5/16\" 0.5 ML misc, 1 each 4 (Four) Times a Day., Disp: 200 each, Rfl: 8  •  lisinopril (PRINIVIL,ZESTRIL) 10 MG tablet, Take 10 mg by mouth Daily., Disp: , Rfl:   •  lisinopril-hydrochlorothiazide (PRINZIDE,ZESTORETIC) 10-12.5 MG per tablet, Take 1 tablet by mouth Daily., Disp: , Rfl:   •  omeprazole (priLOSEC) 20 MG capsule, TK 1 C PO BID, Disp: , Rfl: 5  •  traZODone (DESYREL) 50 MG tablet, Take 1 tablet by mouth Every Night., Disp: 30 tablet, Rfl: 1    Allergies:  Allergies   Allergen Reactions   • Penicillins        Family Hx:  Family History   Problem Relation Age of Onset   • Diabetes Other    • Hypertension Other    • Diabetes Mother         Social History:  Social History     Socioeconomic History   • Marital status:    Tobacco Use   • Smoking status: Never   • Smokeless tobacco: Current     Types: Chew   Substance and Sexual Activity   • Alcohol use: Yes     Comment: SOCIAL   • Drug use: No   • Sexual activity: Defer       Review of Systems  Review of Systems   Constitutional: Negative for chills, fatigue and fever.   HENT: Negative for ear pain, hearing loss, postnasal drip and sore throat.    Eyes: Negative for photophobia and pain.   Respiratory: Negative for cough, shortness of breath and wheezing.    Cardiovascular: Negative for chest pain, palpitations and leg swelling.   Gastrointestinal: Negative for abdominal pain, diarrhea, nausea and vomiting.   Genitourinary: Negative for difficulty urinating and dysuria.   Musculoskeletal: Negative for arthralgias and myalgias.   Skin: Negative for rash and wound.   Neurological: Negative for dizziness, syncope, facial asymmetry, speech " "difficulty, weakness, light-headedness and headaches.   Psychiatric/Behavioral: Negative for dysphoric mood. The patient is not nervous/anxious.        Objective:     /92   Pulse 87   Temp 98 °F (36.7 °C)   Ht 182.9 cm (72\")   Wt 100 kg (220 lb 14.4 oz)   SpO2 99%   BMI 29.96 kg/m²   Physical Exam  Vitals reviewed.   Constitutional:       General: He is not in acute distress.  HENT:      Head: Normocephalic and atraumatic.      Mouth/Throat:      Pharynx: No oropharyngeal exudate.   Eyes:      General: No scleral icterus.     Conjunctiva/sclera: Conjunctivae normal.      Pupils: Pupils are equal, round, and reactive to light.   Cardiovascular:      Rate and Rhythm: Normal rate and regular rhythm.      Heart sounds: Normal heart sounds.   Pulmonary:      Effort: Pulmonary effort is normal.      Breath sounds: Normal breath sounds. No wheezing or rales.   Abdominal:      General: Bowel sounds are normal. There is no distension.      Palpations: Abdomen is soft.      Tenderness: There is no abdominal tenderness.   Musculoskeletal:         General: No deformity.   Skin:     General: Skin is warm and dry.      Capillary Refill: Capillary refill takes less than 2 seconds.   Neurological:      General: No focal deficit present.      Mental Status: He is alert and oriented to person, place, and time.      GCS: GCS eye subscore is 4. GCS verbal subscore is 5. GCS motor subscore is 6.      Cranial Nerves: No cranial nerve deficit, dysarthria or facial asymmetry.      Sensory: No sensory deficit.      Motor: No weakness, atrophy or seizure activity.      Gait: Gait normal.          Assessment/Plan:     Diagnoses and all orders for this visit:    1. Type 1 diabetes mellitus without complication (HCC) (Primary)    1. Checked with pharmacy to make sure patient had refills of his insulin. Patient prefers vials. Setup Dexcom so we can access his reader. Patient happy with current regimen.     Future: titrate " antihypertensive     · Rx changes: none  · Patient Education: DM screening  · Compliance at present is estimated to be fair.   · Efforts to improve compliance (if necessary) will be directed at close follow up.    Depression screening: Up to date; last screen 9/30/2022     Follow-up:     Return in about 4 weeks (around 12/14/2022).    Preventative:  Health Maintenance   Topic Date Due   • Hepatitis B (1 of 3 - 3-dose series) Never done   • COVID-19 Vaccine (1) Never done   • ANNUAL PHYSICAL  Never done   • Pneumococcal Vaccine 0-64 (1 - PCV) Never done   • TDAP/TD VACCINES (1 - Tdap) Never done   • HEPATITIS C SCREENING  Never done   • DIABETIC FOOT EXAM  Never done   • DIABETIC EYE EXAM  Never done   • INFLUENZA VACCINE  Never done   • HEMOGLOBIN A1C  03/30/2023   • URINE MICROALBUMIN  09/30/2023     Weight  -Class: Overweight: 25.0-29.9kg/m2   -BMI is >= 25 and <30. (Overweight) The following options were offered after discussion;: exercise counseling/recommendations and nutrition counseling/recommendations   eat more fruits and vegetables, decrease soda or juice intake, increase water intake and increase physical activity    Alcohol use:  reports current alcohol use.  Nicotine status  reports that he has never smoked. His smokeless tobacco use includes chew.     Goals    None         RISK SCORE: 3       Mason Presley MD   PGY-3    [facility]  [address] [place] [address]  Office: [phone]    This document has been electronically signed by Mason Presley MD on November 21, 2022 10:49 CST

## 2022-11-30 NOTE — PROGRESS NOTES
I have seen the patient.  I have reviewed the notes, assessments, and/or procedures performed by Mason Presley MD  during office visit I concur with her/his documentation and assessment and plan for Woody Pelletier.            This document has been electronically signed by Loli Tripp MD on November 30, 2022 08:58 CST

## 2022-12-05 ENCOUNTER — DOCUMENTATION (OUTPATIENT)
Dept: NUTRITION | Facility: HOSPITAL | Age: 32
End: 2022-12-05

## 2022-12-05 NOTE — PROGRESS NOTES
Nutrition Services    Patient Name:  Woody Pelletier  YOB: 1990  MRN: [id number]  Admit Date:  (Not on file)    Spoke with pt by phone today re the referral for nutrition counseling for Type 1 diabetes after referred by Mason Presley MD. Pt said he didn't need any education and didn't want to come in.  Made provider aware.    Electronically signed by:  Leanne Dubois RD  12/05/22 09:43 CST

## 2022-12-15 ENCOUNTER — TELEPHONE (OUTPATIENT)
Dept: FAMILY MEDICINE CLINIC | Facility: CLINIC | Age: 32
End: 2022-12-15

## 2022-12-15 RX ORDER — LISINOPRIL AND HYDROCHLOROTHIAZIDE 12.5; 1 MG/1; MG/1
1 TABLET ORAL DAILY
Qty: 30 TABLET | Refills: 3 | Status: SHIPPED | OUTPATIENT
Start: 2022-12-15

## 2022-12-15 NOTE — TELEPHONE ENCOUNTER
Yumiko from [facility] called and is requesting a prescription for lisinopril-hydrochlorothiazide (PRINZIDE,ZESTORETIC) 10-12.5 MG per tablet. She stated the patient said they have changed the prescription and he is completely out.    Her#[phone]

## 2023-01-03 ENCOUNTER — TELEPHONE (OUTPATIENT)
Dept: FAMILY MEDICINE CLINIC | Facility: CLINIC | Age: 33
End: 2023-01-03
Payer: COMMERCIAL

## 2023-01-05 DIAGNOSIS — G47.00 INSOMNIA, UNSPECIFIED TYPE: ICD-10-CM

## 2023-01-05 RX ORDER — TRAZODONE HYDROCHLORIDE 50 MG/1
50 TABLET ORAL NIGHTLY
Qty: 90 TABLET | Refills: 1 | Status: SHIPPED | OUTPATIENT
Start: 2023-01-05

## 2023-04-10 RX ORDER — LISINOPRIL AND HYDROCHLOROTHIAZIDE 12.5; 1 MG/1; MG/1
1 TABLET ORAL DAILY
Qty: 90 TABLET | Refills: 1 | Status: SHIPPED | OUTPATIENT
Start: 2023-04-10

## 2023-08-02 ENCOUNTER — DOCUMENTATION (OUTPATIENT)
Dept: FAMILY MEDICINE CLINIC | Facility: CLINIC | Age: 33
End: 2023-08-02
Payer: COMMERCIAL

## 2024-04-24 ENCOUNTER — TELEPHONE (OUTPATIENT)
Dept: VASCULAR SURGERY | Facility: CLINIC | Age: 34
End: 2024-04-24
Payer: COMMERCIAL

## 2024-04-24 NOTE — TELEPHONE ENCOUNTER
HUB TO RELAY   Called patient to confirm appointment on 04/25/24 at 11:45 am. Patient did not answer left VM.